# Patient Record
Sex: MALE | Race: BLACK OR AFRICAN AMERICAN | NOT HISPANIC OR LATINO | Employment: UNEMPLOYED | URBAN - METROPOLITAN AREA
[De-identification: names, ages, dates, MRNs, and addresses within clinical notes are randomized per-mention and may not be internally consistent; named-entity substitution may affect disease eponyms.]

---

## 2023-10-04 ENCOUNTER — TELEPHONE (OUTPATIENT)
Dept: BEHAVIORAL HEALTH | Facility: CLINIC | Age: 33
End: 2023-10-04
Payer: MEDICAID

## 2023-10-08 RX ORDER — CARVEDILOL 3.12 MG/1
3.12 TABLET ORAL 2 TIMES DAILY
COMMUNITY
Start: 2023-09-12

## 2023-10-08 RX ORDER — ADALIMUMAB 40MG/0.4ML
KIT SUBCUTANEOUS
COMMUNITY
Start: 2022-08-31

## 2023-10-08 RX ORDER — OXYBUTYNIN CHLORIDE 5 MG/1
5 TABLET, EXTENDED RELEASE ORAL DAILY
COMMUNITY
Start: 2020-09-04

## 2023-10-08 RX ORDER — DULAGLUTIDE 3 MG/.5ML
INJECTION, SOLUTION SUBCUTANEOUS
COMMUNITY

## 2023-10-08 RX ORDER — BUSPIRONE HYDROCHLORIDE 5 MG/1
5 TABLET ORAL 2 TIMES DAILY
COMMUNITY
Start: 2023-08-01 | End: 2023-10-10

## 2023-10-08 RX ORDER — OMEPRAZOLE 40 MG/1
40 CAPSULE, DELAYED RELEASE ORAL DAILY
COMMUNITY
Start: 2020-09-04

## 2023-10-08 RX ORDER — LISINOPRIL AND HYDROCHLOROTHIAZIDE 12.5; 2 MG/1; MG/1
1 TABLET ORAL DAILY
COMMUNITY
Start: 2023-08-07

## 2023-10-08 RX ORDER — NALTREXONE 380 MG
380 KIT INTRAMUSCULAR
COMMUNITY
Start: 2020-07-23 | End: 2023-10-10 | Stop reason: SDUPTHER

## 2023-10-08 RX ORDER — GLIMEPIRIDE 4 MG/1
4 TABLET ORAL
COMMUNITY

## 2023-10-08 RX ORDER — ZIPRASIDONE HYDROCHLORIDE 80 MG/1
160 CAPSULE ORAL 2 TIMES DAILY
COMMUNITY
End: 2023-10-10 | Stop reason: SDUPTHER

## 2023-10-08 RX ORDER — TRAZODONE HYDROCHLORIDE 150 MG/1
150 TABLET ORAL NIGHTLY
COMMUNITY
Start: 2023-09-12 | End: 2024-03-18 | Stop reason: WASHOUT

## 2023-10-08 RX ORDER — ARIPIPRAZOLE LAUROXIL 882 MG/3.2ML
882 INJECTION, SUSPENSION, EXTENDED RELEASE INTRAMUSCULAR
COMMUNITY
Start: 2020-09-30 | End: 2023-10-10 | Stop reason: SDUPTHER

## 2023-10-08 RX ORDER — ATORVASTATIN CALCIUM 10 MG/1
10 TABLET, FILM COATED ORAL DAILY
COMMUNITY
Start: 2020-09-04

## 2023-10-08 RX ORDER — LEVOTHYROXINE SODIUM 200 UG/1
200 TABLET ORAL
COMMUNITY
Start: 2020-09-04

## 2023-10-08 NOTE — PROGRESS NOTES
A HIPAA-compliant interactive audio and video telecommunication system which permits real time communications between the patient (at the originating site) and provider (at the distant site) was utilized to provide this telehealth service.     The patient, family, caregivers and guardian (as appropriate) have provided consent on this date to conduct treatment via this telehealth service.  The patient's identity and physical location were verified at the time of this visit.      Present for appointment: Mom/guardian (Martinez).    SUBJECTIVE    No significant changes for Chris since we last met. O/C behaviors are no better; still quite problematic. Does not feel that the weekly behavioral therapy sessions done at home through [employer] are providing any real benefits. Mom feels like buspirone may be increasing appetite and causing some associated weight gain for him. He's still having occasional sleepless nights. He has been doing weekly ST at [facility] in [place]. He is not having any EPS or TD as observed by mom.     Review of Systems   Constitutional:  Positive for appetite change.   HENT:  Negative for drooling and trouble swallowing.    Respiratory:  Negative for choking.    Gastrointestinal:  Positive for diarrhea. Negative for constipation and vomiting.   Genitourinary:  Negative for enuresis.   Musculoskeletal:  Negative for gait problem.   Neurological:  Negative for dizziness, tremors, seizures and syncope.   Psychiatric/Behavioral:  Positive for behavioral problems and sleep disturbance.       Controlled Substance Evaluation  [place]ARRS/PDMP reviewed: Nils Willingham MD on 10/10/2023  4:33 PM    MEDICATION HISTORY  Buspirone - dosed up to 5mg twice daily w/o benefit  Fluoxetine - increased irritability  Fluvoxamine - insomnia, behavioral activation  Quetiapine - no benefit  Risperidone - took for many years, caused significant weight gain & gynecomastia  Ritalin - increased  "agitation  Sertraline - insomnia, behavioral activation  Topiramate - irritability    SOCIAL HISTORY  Living situation Lives with family   Provider agency N/A   Work or day program None currently   School N/A   Guardianship Mother (Martinez)   KERWIN [facility] - [employer]   Bx Specialist ?   Nicotine None   Alcohol None   Other drugs None     OBJECTIVE    No results found for: \"HGB\", \"PLT\", \"NEUTROABS\", \"GLUCOSE\", \"NA\", \"K\", \"CO2\", \"CALCIUM\", \"CREATININE\", \"AST\", \"ALT\", \"HGBA1C\", \"AMMONIA\", \"TSH\", \"FREET4\", \"CHOL\", \"LDLF\", \"TRIG\", \"PVSXETAH31\", \"VITD25\"    Therapeutic Drug Monitoring  07/20/2022: Ziprasidone level = 200 [ULN = 220] (320mg/day)    Electrocardiograms  None in chart    MENTAL STATUS EXAM  Patient not present today.    ASSESSMENT  Mom would like to re-try the bupropion for impulsive/compulsive behaviors.  We discussed possibility of trying low-dose long-acting benzodiazepine (clonazepam) as a next possible medication trial.    PROBLEM LIST  1. ASD  2. Impulse control disorder  3. OCD    PLAN  -- Discontinue buspirone  -- Resume bupropion 75mg QAM for OCD and impulse control  -- Continue Aristada 882mg IM Q4 weeks for impulse control  -- Continue Vivitrol (naltrexone) 380mg IM Q4 weeks for impulse control  -- Continue ziprasidone 160mg BID for impulse control  -- Continue trazodone 150mg QHS for sleep (Rxd by PCP)  -- Follow up 2 months    "

## 2023-10-10 ENCOUNTER — TELEMEDICINE (OUTPATIENT)
Dept: BEHAVIORAL HEALTH | Facility: CLINIC | Age: 33
End: 2023-10-10
Payer: MEDICAID

## 2023-10-10 DIAGNOSIS — F63.9 IMPULSE CONTROL DISORDER: ICD-10-CM

## 2023-10-10 DIAGNOSIS — F42.8 OBSESSIVE COMPULSIVE NEUROSIS: Primary | ICD-10-CM

## 2023-10-10 DIAGNOSIS — F84.0 AUTISM SPECTRUM DISORDER (HHS-HCC): ICD-10-CM

## 2023-10-10 PROCEDURE — 99214 OFFICE O/P EST MOD 30 MIN: CPT | Performed by: PSYCHIATRY & NEUROLOGY

## 2023-10-10 RX ORDER — ZIPRASIDONE HYDROCHLORIDE 80 MG/1
160 CAPSULE ORAL 2 TIMES DAILY
Qty: 120 CAPSULE | Refills: 11 | Status: SHIPPED | OUTPATIENT
Start: 2023-10-10

## 2023-10-10 RX ORDER — HYDROCHLOROTHIAZIDE 12.5 MG/1
12.5 TABLET ORAL DAILY
COMMUNITY
End: 2023-10-10 | Stop reason: SDUPTHER

## 2023-10-10 RX ORDER — NALTREXONE 380 MG
380 KIT INTRAMUSCULAR
Qty: 4 ML | Refills: 12 | Status: SHIPPED | OUTPATIENT
Start: 2023-10-10

## 2023-10-10 RX ORDER — BUPROPION HYDROCHLORIDE 75 MG/1
75 TABLET ORAL DAILY
Qty: 30 TABLET | Refills: 1 | Status: SHIPPED | OUTPATIENT
Start: 2023-10-10 | End: 2023-12-05 | Stop reason: SDUPTHER

## 2023-10-10 RX ORDER — TRETINOIN 0.25 MG/G
CREAM TOPICAL NIGHTLY
COMMUNITY

## 2023-10-10 RX ORDER — INSULIN LISPRO 100 [IU]/ML
INJECTION, SOLUTION INTRAVENOUS; SUBCUTANEOUS
COMMUNITY

## 2023-10-10 RX ORDER — LISINOPRIL 20 MG/1
20 TABLET ORAL DAILY
COMMUNITY
End: 2023-10-10 | Stop reason: SDUPTHER

## 2023-10-10 RX ORDER — ARIPIPRAZOLE LAUROXIL 882 MG/3.2ML
882 INJECTION, SUSPENSION, EXTENDED RELEASE INTRAMUSCULAR
Qty: 3.2 ML | Refills: 12 | Status: SHIPPED | OUTPATIENT
Start: 2023-10-10

## 2023-10-10 SDOH — ECONOMIC STABILITY: TRANSPORTATION INSECURITY
IN THE PAST 12 MONTHS, HAS THE LACK OF TRANSPORTATION KEPT YOU FROM MEDICAL APPOINTMENTS OR FROM GETTING MEDICATIONS?: NO

## 2023-10-10 SDOH — ECONOMIC STABILITY: FOOD INSECURITY: WITHIN THE PAST 12 MONTHS, THE FOOD YOU BOUGHT JUST DIDN'T LAST AND YOU DIDN'T HAVE MONEY TO GET MORE.: NEVER TRUE

## 2023-10-10 SDOH — ECONOMIC STABILITY: HOUSING INSECURITY
IN THE LAST 12 MONTHS, WAS THERE A TIME WHEN YOU DID NOT HAVE A STEADY PLACE TO SLEEP OR SLEPT IN A SHELTER (INCLUDING NOW)?: NO

## 2023-10-10 SDOH — ECONOMIC STABILITY: INCOME INSECURITY: IN THE LAST 12 MONTHS, WAS THERE A TIME WHEN YOU WERE NOT ABLE TO PAY THE MORTGAGE OR RENT ON TIME?: NO

## 2023-10-10 SDOH — ECONOMIC STABILITY: TRANSPORTATION INSECURITY
IN THE PAST 12 MONTHS, HAS LACK OF TRANSPORTATION KEPT YOU FROM MEETINGS, WORK, OR FROM GETTING THINGS NEEDED FOR DAILY LIVING?: NO

## 2023-10-10 SDOH — ECONOMIC STABILITY: FOOD INSECURITY: WITHIN THE PAST 12 MONTHS, YOU WORRIED THAT YOUR FOOD WOULD RUN OUT BEFORE YOU GOT MONEY TO BUY MORE.: NEVER TRUE

## 2023-10-10 ASSESSMENT — ENCOUNTER SYMPTOMS
DIARRHEA: 1
SEIZURES: 0
CHOKING: 0
CONSTIPATION: 0
TROUBLE SWALLOWING: 0
VOMITING: 0
SLEEP DISTURBANCE: 1
APPETITE CHANGE: 1
DIZZINESS: 0
TREMORS: 0

## 2023-10-10 ASSESSMENT — SOCIAL DETERMINANTS OF HEALTH (SDOH)
WITHIN THE LAST YEAR, HAVE YOU BEEN HUMILIATED OR EMOTIONALLY ABUSED IN OTHER WAYS BY YOUR PARTNER OR EX-PARTNER?: NO
WITHIN THE LAST YEAR, HAVE TO BEEN RAPED OR FORCED TO HAVE ANY KIND OF SEXUAL ACTIVITY BY YOUR PARTNER OR EX-PARTNER?: NO
WITHIN THE LAST YEAR, HAVE YOU BEEN KICKED, HIT, SLAPPED, OR OTHERWISE PHYSICALLY HURT BY YOUR PARTNER OR EX-PARTNER?: NO
IN THE PAST 12 MONTHS, HAS THE ELECTRIC, GAS, OIL, OR WATER COMPANY THREATENED TO SHUT OFF SERVICE IN YOUR HOME?: NO
WITHIN THE LAST YEAR, HAVE YOU BEEN AFRAID OF YOUR PARTNER OR EX-PARTNER?: NO

## 2023-10-24 ENCOUNTER — PROCEDURE VISIT (OUTPATIENT)
Dept: BEHAVIORAL HEALTH | Facility: CLINIC | Age: 33
End: 2023-10-24
Payer: MEDICAID

## 2023-10-24 VITALS
HEART RATE: 98 BPM | RESPIRATION RATE: 16 BRPM | WEIGHT: 280.6 LBS | DIASTOLIC BLOOD PRESSURE: 87 MMHG | TEMPERATURE: 98.4 F | HEIGHT: 67 IN | SYSTOLIC BLOOD PRESSURE: 134 MMHG | BODY MASS INDEX: 44.04 KG/M2

## 2023-10-24 DIAGNOSIS — F63.9 IMPULSE CONTROL DISORDER: Primary | ICD-10-CM

## 2023-10-24 NOTE — PROGRESS NOTES
Patient here at the clinic for his monthly injections of vivitrol and Aristada for autism spectrum disorder and obsessive compulsive neurosis     Patient identified by full name and , vital signs obtained. Patient last seen by provider on 10/10/23 and last seen by nurse on 23. Medication verified by providers note and medication order.     Appetite: no change  Sleep: broken  Appearance: clean, age appropriate, well-groomed  Build: obese   Attitude: cooperative, calm, pleasant, friendly  Eye Contact: avoidance   Activity: alert, attentive, appropriate  Speech: non-verbal  Delusions: mom denies  Thought Content: Intellectually disabled, non-verbal, but patient will follow simple commands  Thought Process: Intellectually disabled, non-verbal, but follows simple commands  Judgement/insight/ intellectually disabled but can distinguish between right and wrong  Mood: calm,happy   Affect: appropriate  AH/VH/SI/HI: mom denies  Access to firearms/weapons: No  Depression: mom denies  Thoughts of hopelessness: no evidence of this per mom  Anxiety: mom denies  Self-injurious behavior: mom denies  Cravings/Urges: NA  Last use: NA  Tobacco Use: non-smoker  Spiritual or cultural practices that may affect your care or impact your health care decisions? No  Living situation: Patient lives with his mother  Employed: No     Patient accompanied by mother today for monthly Vivitrol and Aristada IM injections. Patient is non-verbal but understands spoken words and able to answer yes and no questions by nodding.Patient can also follow verbal instructions. Per mom,there has been no evidence of SI/HI, self harming behaviors, no new medications or recent hospitalizations to report at this time.     Patient received monthly aristada 882mg (3.2ml) via 20G in his left gluteal area with no issues with the injection and no reaction at the injection site. Patient tolerated procedure well..      Lot: [id number]  Exp: 31 AUG 2025  NDC:  49722-512-99     Patient received Vivitrol 380mg via 20G into right gluteal area. Patient tolerated injection well, no reaction at the injection site.     Lot #: [id number]  Exp: 28 FEB,2026  NDC: 60617-114-49     RN provided education on medication and side effects and the importance of maintaining monthly injection schedule. RN provided mom with an office number to call for any future questions and concerns. Mom was also provided with mobile crisis number for emergent situations.     Patient to RTC in 4 weeks     Anisha Corral RN,BSN

## 2023-10-24 NOTE — PROGRESS NOTES
Patient here at the clinic for his monthly injections of vivitrol and Aristada for autism spectrum disorder and obsessive compulsive neurosis     Patient identified by full name and , vital signs obtained. Patient last seen by provider on 10/10/23 and last seen by nurse on 23. Medication verified by providers note and medication order.     Appetite: no change  Sleep: broken  Appearance: clean, age appropriate, well-groomed  Build: obese   Attitude: cooperative, calm, pleasant, friendly  Eye Contact: avoidance   Activity: alert, attentive, appropriate  Speech: non-verbal  Delusions: mom denies  Thought Content: Intellectually disabled, non-verbal, but patient will follow simple commands  Thought Process: Intellectually disabled, non-verbal, but follows simple commands  Judgement/insight/ intellectually disabled but can distinguish between right and wrong  Mood: calm,happy   Affect: appropriate  AH/VH/SI/HI: mom denies  Access to firearms/weapons: No  Depression: mom denies  Thoughts of hopelessness: no evidence of this per mom  Anxiety: mom denies  Self-injurious behavior: mom denies  Cravings/Urges: NA  Last use: NA  Tobacco Use: non-smoker  Spiritual or cultural practices that may affect your care or impact your health care decisions? No  Living situation: Patient lives with his mother  Employed: No     Patient accompanied by mother today for monthly Vivitrol and Aristada IM injections. Patient is non-verbal but understands spoken words and able with a bright affect. Patient is in a good mood, very cooperative and able to follow verbal instructions. We were able to obtain a blood pressure today and it was a very good reading. Per mom,there has been no evidence of SI/HI, self harming behaviors, no new medications or recent hospitalizations to report at this time.     Patient received monthly aristada 882mg (3.2ml) via 20G in his right gluteal area with no issues with the injection and no reaction at the  injection site. Patient tolerated procedure well..      Lot: [id number]  Exp: FEB28 2025  NDC: 82454-640-67  INJ: 19408     Patient received Vivitrol 380mg via 20G into left gluteal area. Patient tolerated injection well, no reaction at the injection site.     Lot #: [id number]  Exp: 30NOV,2025  ND: 24265-503-24  INJ: [id number]     RN provided education on medication and side effects and the importance of maintaining monthly injection schedule. RN provided mom with an office number to call for any future questions and concerns. Mom was also provided with mobile crisis number for emergent situations.     Patient to RTC in 4 weeks 10/24/23

## 2023-11-21 ENCOUNTER — PROCEDURE VISIT (OUTPATIENT)
Dept: BEHAVIORAL HEALTH | Facility: CLINIC | Age: 33
End: 2023-11-21
Payer: MEDICAID

## 2023-11-21 VITALS
DIASTOLIC BLOOD PRESSURE: 80 MMHG | RESPIRATION RATE: 16 BRPM | TEMPERATURE: 98.1 F | HEART RATE: 99 BPM | SYSTOLIC BLOOD PRESSURE: 134 MMHG

## 2023-11-21 DIAGNOSIS — F63.9 IMPULSE CONTROL DISORDER: ICD-10-CM

## 2023-11-21 DIAGNOSIS — F84.0 AUTISM SPECTRUM DISORDER (HHS-HCC): Primary | ICD-10-CM

## 2023-11-21 RX ORDER — GENTAMICIN SULFATE 1 MG/G
OINTMENT TOPICAL
COMMUNITY
Start: 2023-11-18

## 2023-11-21 RX ORDER — BLOOD SUGAR DIAGNOSTIC
STRIP MISCELLANEOUS
COMMUNITY
Start: 2023-11-15

## 2023-11-21 RX ORDER — ADALIMUMAB 40MG/0.4ML
KIT SUBCUTANEOUS
COMMUNITY
Start: 2023-11-13

## 2023-11-21 RX ORDER — INSULIN LISPRO 100 [IU]/ML
INJECTION, SUSPENSION SUBCUTANEOUS
COMMUNITY
Start: 2023-11-16

## 2023-11-21 RX ORDER — PEN NEEDLE, DIABETIC 32 GX 1/4"
NEEDLE, DISPOSABLE MISCELLANEOUS
COMMUNITY
Start: 2023-11-16

## 2023-11-21 RX ORDER — LANCETS 33 GAUGE
EACH MISCELLANEOUS
COMMUNITY
Start: 2023-11-16

## 2023-11-21 ASSESSMENT — PAIN SCALES - GENERAL: PAINLEVEL: 0-NO PAIN

## 2023-11-21 NOTE — PROGRESS NOTES
Patient here at the clinic for his monthly injections of vivitrol and Aristada for autism spectrum disorder and obsessive compulsive neurosis     Patient identified by full name and , vital signs obtained. Patient last seen by provider on 10/10/23 and last seen by nurse on 10/24/23.  Medication verified by providers note and medication order.     Appetite: no change  Sleep: broken  Appearance: clean, age appropriate, well-groomed  Build: obese   Attitude: cooperative, calm, pleasant, friendly  Eye Contact: avoidance   Activity: alert, attentive, appropriate  Speech: non-verbal  Delusions: mom denies  Thought Content: Intellectually disabled, non-verbal, but patient will follow simple commands  Thought Process: Intellectually disabled, non-verbal, but follows simple commands  Judgement/insight/ intellectually disabled but can distinguish between right and wrong  Mood: calm,happy   Affect: appropriate  AH/VH/SI/HI: mom denies  Access to firearms/weapons: No  Depression: mom denies  Thoughts of hopelessness: no evidence of this per mom  Anxiety: mom denies  Self-injurious behavior: mom denies  Cravings/Urges: NA  Last use: NA  Tobacco Use: non-smoker  Spiritual or cultural practices that may affect your care or impact your health care decisions? No  Living situation: Patient lives with his mother  Employed: No     Patient accompanied by mother today for monthly Vivitrol and Aristada IM injections. Patient is non-verbal but understands spoken words and able to answer yes and no questions by nodding.Patient can also follow verbal instructions. Per mom,there has been no evidence of SI/HI, self harming behaviors, no new medications or recent hospitalizations to report at this time.     Patient received monthly aristada 882mg (3.2ml) via 20G in his left gluteal area with no issues with the injection and no reaction at the injection site. Patient tolerated procedure well..      Lot: [id number]  Exp: 31 AUG 2025  NDC:  25071-258-12     Patient received Vivitrol 380mg via 20G into right gluteal area. Patient tolerated injection well, no reaction at the injection site.     Lot #: [id number]  Exp: 28 FEB,2026  NDC: 26562-186-26     RN provided education on medication and side effects and the importance of maintaining monthly injection schedule. RN provided mom with an office number to call for any future questions and concerns. Mom was also provided with mobile crisis number for emergent situations.     Patient to RTC in 4 weeks     Margot Gu RN,BSN

## 2023-12-05 ENCOUNTER — OFFICE VISIT (OUTPATIENT)
Dept: BEHAVIORAL HEALTH | Facility: CLINIC | Age: 33
End: 2023-12-05
Payer: MEDICAID

## 2023-12-05 DIAGNOSIS — F63.9 IMPULSE CONTROL DISORDER: Primary | ICD-10-CM

## 2023-12-05 DIAGNOSIS — F84.0 AUTISM SPECTRUM DISORDER (HHS-HCC): ICD-10-CM

## 2023-12-05 DIAGNOSIS — E66.01 OBESITY, CLASS III, BMI 40-49.9 (MORBID OBESITY) (MULTI): ICD-10-CM

## 2023-12-05 DIAGNOSIS — F42.8 OBSESSIVE COMPULSIVE NEUROSIS: ICD-10-CM

## 2023-12-05 DIAGNOSIS — F50.81 BINGE EATING DISORDER: ICD-10-CM

## 2023-12-05 PROBLEM — E78.2 MIXED HYPERLIPIDEMIA: Status: ACTIVE | Noted: 2023-12-05

## 2023-12-05 PROBLEM — I10 ESSENTIAL HYPERTENSION: Status: ACTIVE | Noted: 2022-06-16

## 2023-12-05 PROBLEM — F50.819 BINGE EATING DISORDER: Status: ACTIVE | Noted: 2020-04-23

## 2023-12-05 PROBLEM — E55.9 VITAMIN D DEFICIENCY: Status: ACTIVE | Noted: 2023-12-05

## 2023-12-05 PROCEDURE — 90846 FAMILY PSYTX W/O PT 50 MIN: CPT | Performed by: PSYCHIATRY & NEUROLOGY

## 2023-12-05 PROCEDURE — 1036F TOBACCO NON-USER: CPT | Performed by: PSYCHIATRY & NEUROLOGY

## 2023-12-05 RX ORDER — BUPROPION HYDROCHLORIDE 75 MG/1
75 TABLET ORAL DAILY
Qty: 30 TABLET | Refills: 2 | Status: SHIPPED | OUTPATIENT
Start: 2023-12-05 | End: 2024-03-18 | Stop reason: WASHOUT

## 2023-12-05 ASSESSMENT — ENCOUNTER SYMPTOMS
CONSTIPATION: 0
DIZZINESS: 0
TREMORS: 0
VOMITING: 0
SEIZURES: 0
CHOKING: 0
APPETITE CHANGE: 1
SLEEP DISTURBANCE: 0
TROUBLE SWALLOWING: 0
DIARRHEA: 0

## 2023-12-05 NOTE — PROGRESS NOTES
"Outpatient Psychiatry    A HIPAA-compliant interactive audio and video telecommunication system which permits real time communications between the patient (at the originating site) and provider (at the distant site) was utilized to provide this telehealth service.     The patient, family, caregivers and guardian (as appropriate) have provided consent on this date to conduct treatment via this telehealth service.  The patient's identity and physical location were verified at the time of this visit.      Present for appointment: Mom/guardian (Martinez).    SUBJECTIVE    Mom says Chris is doing \"fair\" to \"pretty good.\"  O/C behaviors are about the same (\"no better, no worse\" per mom).  Seems to be tolerating bupropion well without side effects.  He has been able to focus on his weekly one-hour speech therapy sessions which he enjoys.  Not as many sleep difficulties as before.     Review of Systems   Constitutional:  Positive for appetite change (decrease).   HENT:  Negative for drooling and trouble swallowing.    Respiratory:  Negative for choking.    Gastrointestinal:  Negative for constipation, diarrhea and vomiting.   Genitourinary:  Negative for enuresis.   Musculoskeletal:  Negative for gait problem.   Neurological:  Negative for dizziness, tremors, seizures and syncope.   Psychiatric/Behavioral:  Positive for behavioral problems. Negative for self-injury and sleep disturbance.      MEDICATION HISTORY  Buspirone - dosed up to 5mg twice daily w/o benefit  Fluoxetine - increased irritability  Fluvoxamine - insomnia, behavioral activation  Quetiapine - no benefit  Risperidone - took for many years, caused significant weight gain & gynecomastia  Ritalin - increased agitation  Sertraline - insomnia, behavioral activation  Topiramate - irritability    SOCIAL HISTORY  Living situation Lives with family   Provider agency N/A   Work or day program None currently  Weekly ST at [employer].    School N/A "   Guardianship Mother (Martinez)   KERWIN [facility]   Bx Specialist ?   Nicotine None   Alcohol None   Other drugs None     OBJECTIVE    Lab Results   Component Value Date    HGBA1C 5.6 04/08/2023     Therapeutic Drug Monitoring  07/20/2022: Ziprasidone level = 200 [ULN = 220] (320mg/day)    Electrocardiograms  None in chart    MENTAL STATUS EXAM  Patient unable to participate in virtual visit    ASSESSMENT  Continue current meds. We will try to obtain a trough serum aripiprazole level prior to his next Aristada injection on 12/19/23.    PROBLEM LIST  ASD  Impulse control disorder  OCD    PLAN  -- Continue bupropion 75mg QAM for OCD and impulse control  -- Continue Aristada 882mg IM Q4 weeks for impulse control  -- Continue Vivitrol (naltrexone) 380mg IM Q4 weeks for impulse control  -- Continue ziprasidone 160mg BID for impulse control  -- Continue trazodone 150mg QHS for sleep (Rxd by PCP)  -- Follow up 8 weeks    Nils Willingham MD

## 2023-12-06 ENCOUNTER — TELEPHONE (OUTPATIENT)
Dept: BEHAVIORAL HEALTH | Facility: CLINIC | Age: 33
End: 2023-12-06
Payer: MEDICAID

## 2023-12-06 NOTE — TELEPHONE ENCOUNTER
----- Message from Nils Willingham MD sent at 12/5/2023  5:24 PM EST -----  Regarding: Lab order  Still trying to figure out how to get a serum Abilify level done.  Mom is going to try to take him to a [facility] lab just before his next injection in December.  We just need to call [facility] lab and find out if they can order it on their end if mom brings in the paper order requisition from .  Appreciate if you can call the lab to find out (and if there are any particular instructions to give to mom let me know and I'll send her lab req.).  Thanks!

## 2023-12-19 ENCOUNTER — PROCEDURE VISIT (OUTPATIENT)
Dept: BEHAVIORAL HEALTH | Facility: CLINIC | Age: 33
End: 2023-12-19
Payer: MEDICAID

## 2023-12-19 VITALS
RESPIRATION RATE: 16 BRPM | SYSTOLIC BLOOD PRESSURE: 128 MMHG | DIASTOLIC BLOOD PRESSURE: 79 MMHG | TEMPERATURE: 98.1 F | HEART RATE: 80 BPM

## 2023-12-19 DIAGNOSIS — F84.0 AUTISM SPECTRUM DISORDER (HHS-HCC): ICD-10-CM

## 2023-12-19 DIAGNOSIS — F42.8 OBSESSIVE COMPULSIVE NEUROSIS: Primary | ICD-10-CM

## 2023-12-19 ASSESSMENT — PAIN SCALES - GENERAL: PAINLEVEL: 0-NO PAIN

## 2023-12-19 NOTE — PROGRESS NOTES
Patient here at the clinic for his monthly injections of vivitrol and Aristada for autism spectrum disorder and obsessive compulsive neurosis     Patient identified by full name and , vital signs obtained. Patient last seen by provider on 23 and last seen by nurse on 23.  Medication verified by providers note and medication order.     Appetite: no change  Sleep: broken  Appearance: clean, age appropriate, well-groomed  Build: obese   Attitude: cooperative, calm, pleasant, friendly  Eye Contact: avoidance   Activity: alert, attentive, appropriate  Speech: non-verbal  Delusions: mom denies  Thought Content: Intellectually disabled, non-verbal, but patient will follow simple commands  Thought Process: Intellectually disabled, non-verbal, but follows simple commands  Judgement/insight/ intellectually disabled but can distinguish between right and wrong  Mood: calm,happy   Affect: appropriate  AH/VH/SI/HI: mom denies  Access to firearms/weapons: No  Depression: mom denies  Thoughts of hopelessness: no evidence of this per mom  Anxiety: mom denies  Self-injurious behavior: mom denies  Cravings/Urges: NA  Last use: NA  Tobacco Use: non-smoker  Spiritual or cultural practices that may affect your care or impact your health care decisions? No  Living situation: Patient lives with his mother  Employed: No     Patient accompanied by mother today for monthly Vivitrol and Aristada IM injections. Patient is non-verbal but understands spoken words and able to answer yes and no questions by nodding his head.  Patient can also follow verbal instructions. Per mom,there has been no evidence of SI/HI, self harming behaviors, no new medications or recent hospitalizations to report at this time.     Patient received monthly aristada 882mg (3.2ml) via 20G in his right gluteal area with no issues with the injection and no reaction at the injection site. Patient tolerated procedure well..      Lot: [id number]  Exp: 31 AUG  2025  NDC: 41366-222-08     Patient received Vivitrol 380mg via 20G into left gluteal area. Patient tolerated injection well, no reaction at the injection site.     Lot #: [id number]  Exp: [id number]  NDC: 46501-318-63     RN provided education on medication and side effects and the importance of maintaining monthly injection schedule. RN provided mom with an office number to call for any future questions and concerns. Mom was also provided with mobile crisis number for emergent situations.     Patient to RTC in 4 weeks 1/16/24    Margot Gu RN,BSN

## 2024-01-16 ENCOUNTER — PROCEDURE VISIT (OUTPATIENT)
Dept: BEHAVIORAL HEALTH | Facility: CLINIC | Age: 34
End: 2024-01-16
Payer: MEDICAID

## 2024-01-16 VITALS — TEMPERATURE: 97.8 F | RESPIRATION RATE: 16 BRPM | HEART RATE: 77 BPM

## 2024-01-16 DIAGNOSIS — F42.8 OBSESSIVE COMPULSIVE NEUROSIS: Primary | ICD-10-CM

## 2024-01-16 DIAGNOSIS — F84.0 AUTISM SPECTRUM DISORDER (HHS-HCC): ICD-10-CM

## 2024-01-16 ASSESSMENT — PAIN SCALES - GENERAL: PAINLEVEL: 0-NO PAIN

## 2024-01-16 NOTE — PROGRESS NOTES
Patient here at the clinic for his monthly injections of vivitrol and Aristada for autism spectrum disorder and obsessive compulsive neurosis     Patient identified by full name and , vital signs obtained. Patient last seen by provider on 23 and last seen by nurse on 23.  Medication verified by providers note and medication order.     Appetite: no change  Sleep: broken  Appearance: clean, age appropriate, well-groomed  Build: obese   Attitude: cooperative, calm, pleasant, friendly  Eye Contact: avoidance   Activity: alert, attentive, appropriate  Speech: non-verbal  Delusions: mom denies  Thought Content: Intellectually disabled, non-verbal, but patient will follow simple commands  Thought Process: Intellectually disabled, non-verbal, but follows simple commands  Judgement/insight/ intellectually disabled but can distinguish between right and wrong  Mood: calm,happy   Affect: appropriate  AH/VH/SI/HI: mom denies  Access to firearms/weapons: No  Depression: mom denies  Thoughts of hopelessness: no evidence of this per mom  Anxiety: mom denies  Self-injurious behavior: mom denies  Cravings/Urges: NA  Last use: NA  Tobacco Use: non-smoker  Spiritual or cultural practices that may affect your care or impact your health care decisions? No  Living situation: Patient lives with his mother  Employed: No     Patient accompanied by mother today for monthly Vivitrol and Aristada IM injections. Patient is non-verbal but understands spoken words and able to answer yes and no questions by nodding his head.  Patient can also follow verbal instructions. Per mom,there has been no evidence of SI/HI, self harming behaviors, no new medications or recent hospitalizations to report at this time.     Patient received monthly aristada 882mg (3.2ml) via 20G in his left gluteal area with no issues with the injection and no reaction at the injection site. Patient tolerated procedure well..      Lot: [id number]  Exp: 31 AUG  2025  NDC: 15436-207-95     Patient received Vivitrol 380mg via 20G into right gluteal area. Patient tolerated injection well, no reaction at the injection site.     Lot #: [id number]  Exp: [id number]  NDC: 97866-771-90     RN provided education on medication and side effects and the importance of maintaining monthly injection schedule. RN provided mom with an office number to call for any future questions and concerns. Mom was also provided with mobile crisis number for emergent situations.     Patient to RTC in 4 weeks 2/13/23    Margot Gu RN,BSN

## 2024-01-30 ENCOUNTER — APPOINTMENT (OUTPATIENT)
Dept: BEHAVIORAL HEALTH | Facility: CLINIC | Age: 34
End: 2024-01-30
Payer: MEDICAID

## 2024-01-30 NOTE — PROGRESS NOTES
"Outpatient Psychiatry    A HIPAA-compliant interactive audio and video telecommunication system which permits real time communications between the patient (at the originating site) and provider (at the distant site) was utilized to provide this telehealth service.     The patient, family, caregivers and guardian (as appropriate) have provided consent on this date to conduct treatment via this telehealth service.  The patient's identity and physical location were verified at the time of this visit.      Present for appointment: Mom/guardian (Martinez).    SUBJECTIVE    Mom says Chris is doing \"fair\" to \"pretty good.\"  O/C behaviors are about the same (\"no better, no worse\" per mom).  Seems to be tolerating bupropion well without side effects.  He has been able to focus on his weekly one-hour speech therapy sessions which he enjoys.  Not as many sleep difficulties as before.     Review of Systems    MEDICATION HISTORY  Buspirone - dosed up to 5mg twice daily w/o benefit  Fluoxetine - increased irritability  Fluvoxamine - insomnia, behavioral activation  Quetiapine - no benefit  Risperidone - took for many years, caused significant weight gain & gynecomastia  Ritalin - increased agitation  Sertraline - insomnia, behavioral activation  Topiramate - irritability    SOCIAL HISTORY  Living situation Lives with family   Provider agency N/A   Work or day program None currently  Weekly ST at [employer] [place].    School N/A   Guardianship Mother (Martinez)   KERWIN [facility]   Bx Specialist ?   Nicotine None   Alcohol None   Other drugs None     OBJECTIVE    Lab Results   Component Value Date    HGBA1C 5.8 11/27/2023     Therapeutic Drug Monitoring  07/20/2022: Ziprasidone level = 200 [ULN = 220] (320mg/day)    Electrocardiograms  None in chart    MENTAL STATUS EXAM  Patient unable to participate in virtual visit    ASSESSMENT  Continue current meds. We will try to obtain a trough serum " aripiprazole level prior to his next Aristada injection on 12/19/23.    PROBLEM LIST  ASD  Impulse control disorder  OCD    PLAN  -- Continue bupropion 75mg QAM for OCD and impulse control  -- Continue Aristada 882mg IM Q4 weeks for impulse control  -- Continue Vivitrol (naltrexone) 380mg IM Q4 weeks for impulse control  -- Continue ziprasidone 160mg BID for impulse control  -- Continue trazodone 150mg QHS for sleep (Rxd by PCP)  -- Follow up 8 weeks    Nils Willingham MD    Prep time on date of the patient encounter:  minutes   Time spent directly with patient/family/caregiver:  minutes   Additional time spent on patient care activities:  minutes   Documentation time:  minutes   Other time spent:  minutes   Total time on date of patient encounter:  minutes

## 2024-02-06 ENCOUNTER — APPOINTMENT (OUTPATIENT)
Dept: BEHAVIORAL HEALTH | Facility: CLINIC | Age: 34
End: 2024-02-06
Payer: MEDICAID

## 2024-02-13 ENCOUNTER — PROCEDURE VISIT (OUTPATIENT)
Dept: BEHAVIORAL HEALTH | Facility: CLINIC | Age: 34
End: 2024-02-13
Payer: MEDICAID

## 2024-02-13 VITALS — TEMPERATURE: 97.8 F | HEART RATE: 88 BPM | RESPIRATION RATE: 18 BRPM

## 2024-02-13 DIAGNOSIS — F63.9 IMPULSE CONTROL DISORDER: Primary | ICD-10-CM

## 2024-02-13 DIAGNOSIS — F84.0 AUTISM SPECTRUM DISORDER (HHS-HCC): ICD-10-CM

## 2024-02-13 ASSESSMENT — PAIN SCALES - GENERAL: PAINLEVEL: 0-NO PAIN

## 2024-02-13 NOTE — PROGRESS NOTES
Patient here at the clinic for his monthly injections of vivitrol and Aristada for autism spectrum disorder and obsessive compulsive neurosis     Patient identified by full name and , vital signs obtained. Patient last seen by provider on 23 and last seen by nurse on 24  Medication verified by providers note and medication order.     Appetite: no change  Sleep: broken  Appearance: clean, age appropriate, well-groomed  Build: obese   Attitude: cooperative, calm, pleasant, friendly  Eye Contact: avoidance   Activity: alert, attentive, appropriate  Speech: non-verbal  Delusions: mom denies  Thought Content: Intellectually disabled, non-verbal, but patient will follow simple commands  Thought Process: Intellectually disabled, non-verbal, but follows simple commands  Judgement/insight/ intellectually disabled but can distinguish between right and wrong  Mood: calm,happy   Affect: appropriate  AH/VH/SI/HI: mom denies  Access to firearms/weapons: No  Depression: mom denies  Thoughts of hopelessness: no evidence of this per mom  Anxiety: mom denies  Self-injurious behavior: mom denies  Cravings/Urges: NA  Last use: NA  Tobacco Use: non-smoker  Spiritual or cultural practices that may affect your care or impact your health care decisions? No  Living situation: Patient lives with his mother  Employed: No     Patient accompanied by mother today for monthly Vivitrol and Aristada IM injections. Patient is non-verbal but understands spoken words and able to answer yes and no questions by nodding his head.  Patient can also follow verbal instructions. Per mom,there has been no evidence of SI/HI, self harming behaviors, no new medications or recent hospitalizations to report at this time.     Patient received monthly aristada 882mg (3.2ml) via 20G in his right gluteal area with no issues with the injection and no reaction at the injection site. Patient tolerated procedure well..      Lot: [id number]  Exp: 31 AUG  2025  NDC: 68612-927-01     Patient received Vivitrol 380mg via 20G into left gluteal area. Patient tolerated injection well, no reaction at the injection site.     Lot #: [id number]  Exp: [id number]  NDC: 07545-204-67     RN provided education on medication and side effects and the importance of maintaining monthly injection schedule. RN provided mom with an office number to call for any future questions and concerns. Mom was also provided with mobile crisis number for emergent situations.     Patient to RTC in 4 weeks 3/12/24    Margot Gu RN,BSN

## 2024-03-05 ENCOUNTER — APPOINTMENT (OUTPATIENT)
Dept: BEHAVIORAL HEALTH | Facility: CLINIC | Age: 34
End: 2024-03-05
Payer: MEDICAID

## 2024-03-12 ENCOUNTER — PROCEDURE VISIT (OUTPATIENT)
Dept: BEHAVIORAL HEALTH | Facility: CLINIC | Age: 34
End: 2024-03-12
Payer: MEDICAID

## 2024-03-12 VITALS
RESPIRATION RATE: 18 BRPM | SYSTOLIC BLOOD PRESSURE: 120 MMHG | TEMPERATURE: 98.4 F | HEART RATE: 93 BPM | DIASTOLIC BLOOD PRESSURE: 83 MMHG

## 2024-03-12 DIAGNOSIS — F84.0 AUTISM SPECTRUM DISORDER (HHS-HCC): ICD-10-CM

## 2024-03-12 DIAGNOSIS — F42.8 OBSESSIVE COMPULSIVE NEUROSIS: Primary | ICD-10-CM

## 2024-03-12 ASSESSMENT — PAIN SCALES - GENERAL: PAINLEVEL: 0-NO PAIN

## 2024-03-12 NOTE — PROGRESS NOTES
Patient here at the clinic for his monthly injections of vivitrol and Aristada for autism spectrum disorder and obsessive compulsive neurosis     Patient identified by full name and , vital signs obtained. Patient last seen by provider on 23 and last seen by nurse on 24  Medication verified by providers note and medication order.     Appetite: no change  Sleep: broken  Appearance: clean, age appropriate, well-groomed  Build: obese   Attitude: cooperative, calm, pleasant, friendly  Eye Contact: avoidance   Activity: alert, attentive, appropriate  Speech: non-verbal  Delusions: mom denies  Thought Content: Intellectually disabled, non-verbal, but patient will follow simple commands  Thought Process: Intellectually disabled, non-verbal, but follows simple commands  Judgement/insight/ intellectually disabled but can distinguish between right and wrong  Mood: calm,happy   Affect: appropriate  AH/VH/SI/HI: mom denies  Access to firearms/weapons: No  Depression: mom denies  Thoughts of hopelessness: no evidence of this per mom  Anxiety: mom denies  Self-injurious behavior: mom denies  Cravings/Urges: NA  Last use: NA  Tobacco Use: non-smoker  Spiritual or cultural practices that may affect your care or impact your health care decisions? No  Living situation: Patient lives with his mother  Employed: No     Patient accompanied by mother today for monthly Vivitrol and Aristada IM injections. Patient is non-verbal but understands spoken words and able to answer yes and no questions by nodding his head.  Patient can also follow verbal instructions. Per mom,there has been no evidence of SI/HI, self harming behaviors, no new medications or recent hospitalizations to report at this time.     Patient received monthly aristada 882mg (3.2ml) via 20G in his left gluteal area with no issues with the injection and no reaction at the injection site. Patient tolerated procedure well..      Lot: [id number]  Exp:   2025  NDC: 80257-555-50     Patient received Vivitrol 380mg via 20G into right gluteal area. Patient tolerated injection well, no reaction at the injection site.     Lot #: [id number]  Exp: [id number]  NDC: 49531-160-92     RN provided education on medication and side effects and the importance of maintaining monthly injection schedule. RN provided mom with an office number to call for any future questions and concerns. Mom was also provided with mobile crisis number for emergent situations.     Patient to RTC in 4 weeks 4/9/24    Margot Gu,ISH,BSN

## 2024-03-18 ENCOUNTER — OFFICE VISIT (OUTPATIENT)
Dept: BEHAVIORAL HEALTH | Facility: CLINIC | Age: 34
End: 2024-03-18
Payer: MEDICAID

## 2024-03-18 DIAGNOSIS — F42.8 OBSESSIVE COMPULSIVE NEUROSIS: Primary | ICD-10-CM

## 2024-03-18 DIAGNOSIS — F84.0 AUTISM SPECTRUM DISORDER (HHS-HCC): ICD-10-CM

## 2024-03-18 DIAGNOSIS — F63.9 IMPULSE CONTROL DISORDER: ICD-10-CM

## 2024-03-18 DIAGNOSIS — F50.81 BINGE EATING DISORDER: ICD-10-CM

## 2024-03-18 PROCEDURE — 99214 OFFICE O/P EST MOD 30 MIN: CPT | Performed by: PSYCHIATRY & NEUROLOGY

## 2024-03-18 PROCEDURE — 1036F TOBACCO NON-USER: CPT | Performed by: PSYCHIATRY & NEUROLOGY

## 2024-03-18 ASSESSMENT — ENCOUNTER SYMPTOMS
POLYDIPSIA: 0
TROUBLE SWALLOWING: 0
SLEEP DISTURBANCE: 0
DIARRHEA: 0
DIZZINESS: 0
VOMITING: 0
SEIZURES: 0
CONSTIPATION: 0
TREMORS: 0
CHOKING: 0

## 2024-03-18 NOTE — PROGRESS NOTES
Outpatient Psychiatry    A telephone visit (audio only) between the patient (at the originating site) and the provider (at the distant site) was utilized to provide this telehealth service.    The patient, family, caregivers and guardian (as appropriate) have provided consent to conduct treatment via this telehealth service.  The patient's identity and physical location were verified at the time of this visit.      Present for appointment: Mom/guardian (Martinez).    SUBJECTIVE    Mom stopped giving bupropion about one month ago after he had a spontaneous episode of attempting to bite her, which she attributed to use of the medication.  She also stopped the trazodone because there was some concern that it might be contributing to some recent hair loss.  No significant changes in O/C behaviors.  Still doing speech therapy one hour per week.  [facility] his injections w/o problems.  No change in sleep patterns.  Weight/appetite stable.     Review of Systems   HENT:  Negative for drooling and trouble swallowing.    Respiratory:  Negative for choking.    Gastrointestinal:  Negative for constipation, diarrhea and vomiting.   Endocrine: Negative for polydipsia and polyuria.   Genitourinary:  Negative for enuresis.   Musculoskeletal:  Negative for gait problem.   Neurological:  Negative for dizziness, tremors, seizures and syncope.   Psychiatric/Behavioral:  Positive for behavioral problems. Negative for self-injury and sleep disturbance.      MEDICATION HISTORY  Bupropion  Buspirone - dosed up to 5mg twice daily w/o benefit  Fluoxetine - increased irritability  Fluvoxamine - insomnia, behavioral activation  Quetiapine - no benefit  Risperidone - took for many years, caused significant weight gain & gynecomastia  Ritalin - increased agitation  Sertraline - insomnia, behavioral activation  Topiramate - irritability  Trazodone    SOCIAL HISTORY  Living situation Lives with family   Provider  agency N/A   Work or day program None currently  Weekly ST at [facility] N/A   Guardianship Mother (Martinez)   KERWIN [facility]on [facility]   Bx Specialist ?   Nicotine None   Alcohol None   Other drugs None     OBJECTIVE    Lab Results   Component Value Date    HGBA1C 5.8 11/27/2023     Therapeutic Drug Monitoring  07/20/2022: Ziprasidone level = 200 [ULN = 220] (320mg/day)    Electrocardiograms  None in chart    MENTAL STATUS EXAM  Patient unable to participate in telephone visit    ASSESSMENT  Continue current meds -- unclear if there are any other viable treatment options for O/C behaviors we could consider at this time.  We will try to obtain a serum aripiprazole level with next routine labs (4/03/24).    PROBLEM LIST  ASD  Impulse control disorder  OCD    PLAN  -- Continue Aristada 882mg IM Q4 weeks for impulse control (next due 4/09/24)  -- Continue Vivitrol (naltrexone) 380mg IM Q4 weeks for impulse control (next due 4/09/24)  -- Continue ziprasidone 160mg BID for impulse control  -- Follow up 8 weeks    Nils Willingham MD

## 2024-04-09 ENCOUNTER — PROCEDURE VISIT (OUTPATIENT)
Dept: BEHAVIORAL HEALTH | Facility: CLINIC | Age: 34
End: 2024-04-09
Payer: MEDICAID

## 2024-04-09 VITALS
HEIGHT: 67 IN | SYSTOLIC BLOOD PRESSURE: 141 MMHG | TEMPERATURE: 97.6 F | BODY MASS INDEX: 44.59 KG/M2 | WEIGHT: 284.1 LBS | HEART RATE: 103 BPM | DIASTOLIC BLOOD PRESSURE: 96 MMHG | RESPIRATION RATE: 16 BRPM

## 2024-04-09 DIAGNOSIS — F42.8 OBSESSIVE COMPULSIVE NEUROSIS: Primary | ICD-10-CM

## 2024-04-09 NOTE — PROGRESS NOTES
Patient here at the clinic for his monthly injections of vivitrol and Aristada for autism spectrum disorder and obsessive compulsive neurosis     Patient identified by full name and , vital signs obtained. Patient last seen by provider on 23 and last seen by nurse on 24  Medication verified by providers note and medication order.     Appetite: no change  Sleep: broken  Appearance: clean, age appropriate, well-groomed  Build: obese   Attitude: cooperative, calm, pleasant, friendly  Eye Contact: avoidance   Activity: alert, attentive, appropriate  Speech: non-verbal  Delusions: mom denies  Thought Content: Intellectually disabled, non-verbal, but patient will follow simple commands  Thought Process: Intellectually disabled, non-verbal, but follows simple commands  Judgement/insight/ intellectually disabled but can distinguish between right and wrong  Mood: calm,happy   Affect: appropriate  AH/VH/SI/HI: mom denies  Access to firearms/weapons: No  Depression: mom denies  Thoughts of hopelessness: no evidence of this per mom  Anxiety: mom denies  Self-injurious behavior: mom denies  Cravings/Urges: NA  Last use: NA  Tobacco Use: non-smoker  Spiritual or cultural practices that may affect your care or impact your health care decisions? No  Living situation: Patient lives with his mother  Employed: No     Patient accompanied by mother today for monthly Vivitrol and Aristada IM injections. Patient is non-verbal but understands spoken words and able to answer yes and no questions by nodding his head.  Patient can also follow verbal instructions. Per mom,there has been no evidence of SI/HI, self harming behaviors, no new medications or recent hospitalizations to report at this time.     Patient received monthly aristada 882mg (3.2ml) via 20G in his right gluteal area with no issues at the injection site. Patient tolerated procedure well..      Lot: [id number]  Exp: 2025  NDC: 32578-374-06     Patient  received Vivitrol 380mg via 20G into left gluteal area. Patient tolerated injection well, no reaction at the injection site.     Lot #: [id number]  Exp: [id number]  NDC: 17170-264-23     RN provided education on medication and side effects and the importance of maintaining monthly injection schedule. RN provided mom with an office number to call for any future questions and concerns. Mom was also provided with mobile crisis number for emergent situations.     Patient to RTC in 4 weeks     Anisha Corral RN,BSN

## 2024-05-07 ENCOUNTER — PROCEDURE VISIT (OUTPATIENT)
Dept: BEHAVIORAL HEALTH | Facility: CLINIC | Age: 34
End: 2024-05-07
Payer: MEDICAID

## 2024-05-07 VITALS
RESPIRATION RATE: 18 BRPM | TEMPERATURE: 97.8 F | DIASTOLIC BLOOD PRESSURE: 87 MMHG | HEART RATE: 100 BPM | SYSTOLIC BLOOD PRESSURE: 131 MMHG

## 2024-05-07 DIAGNOSIS — F84.0 AUTISM SPECTRUM DISORDER (HHS-HCC): ICD-10-CM

## 2024-05-07 DIAGNOSIS — F42.8 OBSESSIVE COMPULSIVE NEUROSIS: Primary | ICD-10-CM

## 2024-05-07 ASSESSMENT — PAIN SCALES - GENERAL: PAINLEVEL: 0-NO PAIN

## 2024-05-07 NOTE — PROGRESS NOTES
Patient here at the clinic for his monthly injections of vivitrol and Aristada for autism spectrum disorder and obsessive compulsive neurosis     Patient identified by full name and , vital signs obtained. Patient last seen by provider on 3/18/24 and last seen by nurse on 24  Medication verified by providers note and medication order.     Appetite: no change  Sleep: broken  Appearance: clean, age appropriate, well-groomed  Build: obese   Attitude: cooperative, calm, pleasant, friendly  Eye Contact: avoidance   Activity: alert, attentive, appropriate  Speech: non-verbal  Delusions: mom denies  Thought Content: Intellectually disabled, non-verbal, but patient will follow simple commands  Thought Process: Intellectually disabled, non-verbal, but follows simple commands  Judgement/insight/ intellectually disabled but can distinguish between right and wrong  Mood: calm,happy   Affect: appropriate  AH/VH/SI/HI: mom denies  Access to firearms/weapons: No  Depression: mom denies  Thoughts of hopelessness: no evidence of this per mom  Anxiety: mom denies  Self-injurious behavior: mom denies  Cravings/Urges: NA  Last use: NA  Tobacco Use: non-smoker  Spiritual or cultural practices that may affect your care or impact your health care decisions? No  Living situation: Patient lives with his mother  Employed: No     Patient accompanied by mother today for monthly Vivitrol and Aristada IM injections. Patient is non-verbal but understands spoken words and able to answer yes and no questions by nodding his head.  Patient can also follow verbal instructions. Per mom,there has been no evidence of SI/HI, self harming behaviors, no new medications or recent hospitalizations to report at this time.     Patient received monthly aristada 882mg (3.2ml) via 20G in his left gluteal area with no issues at the injection site. Patient tolerated procedure well..      Lot: [id number]  Exp: 2025  NDC: 75181-782-70     Patient  received Vivitrol 380mg via 20G into right gluteal area. Patient tolerated injection well, no reaction at the injection site.     Lot #: [id number]  Exp: [id number]  NDC: 26617-565-75     RN provided education on medication and side effects and the importance of maintaining monthly injection schedule. RN provided mom with an office number to call for any future questions and concerns. Mom was also provided with mobile crisis number for emergent situations.     Patient to RTC in 4 weeks     Margot Gu RN, BSN

## 2024-05-14 ENCOUNTER — OFFICE VISIT (OUTPATIENT)
Dept: BEHAVIORAL HEALTH | Facility: CLINIC | Age: 34
End: 2024-05-14
Payer: MEDICAID

## 2024-05-14 DIAGNOSIS — F84.0 AUTISM SPECTRUM DISORDER (HHS-HCC): ICD-10-CM

## 2024-05-14 DIAGNOSIS — F63.9 IMPULSE CONTROL DISORDER: ICD-10-CM

## 2024-05-14 DIAGNOSIS — F42.8 OBSESSIVE COMPULSIVE NEUROSIS: Primary | ICD-10-CM

## 2024-05-14 PROCEDURE — 1036F TOBACCO NON-USER: CPT | Performed by: PSYCHIATRY & NEUROLOGY

## 2024-05-14 PROCEDURE — 99214 OFFICE O/P EST MOD 30 MIN: CPT | Performed by: PSYCHIATRY & NEUROLOGY

## 2024-05-14 RX ORDER — LORAZEPAM 2 MG/1
TABLET ORAL
Qty: 5 TABLET | Refills: 0 | Status: SHIPPED | OUTPATIENT
Start: 2024-05-14

## 2024-05-14 RX ORDER — RAMELTEON 8 MG/1
8 TABLET ORAL NIGHTLY
COMMUNITY

## 2024-05-14 ASSESSMENT — ENCOUNTER SYMPTOMS
TREMORS: 0
VOMITING: 0
SEIZURES: 0
DIARRHEA: 0
FEVER: 0
SLEEP DISTURBANCE: 0
DIZZINESS: 0
POLYDIPSIA: 0
TROUBLE SWALLOWING: 0
UNEXPECTED WEIGHT CHANGE: 0
CONSTIPATION: 0
CHOKING: 0

## 2024-05-14 NOTE — PROGRESS NOTES
Outpatient Psychiatry    A HIPAA-compliant interactive audio and video telecommunication system which permits real time communications between the patient (at the originating site) and provider (at the distant site) was utilized to provide this telehealth service.     The patient, family, caregivers and guardian (as appropriate) have provided consent to conduct treatment via this telehealth service.  The patient's identity and physical location were verified at the time of this visit.      Present for appointment: Chris and mom/guardian (Martinez).    SUBJECTIVE    Had a recent consult with Hem[facility]Onc at [facility] due to chronic mild leukocytosis.  Has upcoming dental appointment at [facility].  No significant changes in O/C behaviors.  Still doing speech therapy one hour per week.  [facility] for his injections w/o problems.  No change in sleep patterns; PCP prescribed ramelteon.  Weight/appetite stable.     Review of Systems   Constitutional:  Negative for fever and unexpected weight change.   HENT:  Negative for drooling and trouble swallowing.    Respiratory:  Negative for choking.    Gastrointestinal:  Negative for constipation, diarrhea and vomiting.   Endocrine: Negative for polydipsia and polyuria.   Genitourinary:  Negative for enuresis.   Musculoskeletal:  Negative for gait problem.   Neurological:  Negative for dizziness, tremors, seizures and syncope.   Psychiatric/Behavioral:  Positive for behavioral problems. Negative for self-injury and sleep disturbance.      OARRS/PDMP reviewed: Nils Willingham MD on 5/14/2024  5:08 PM  I have personally reviewed the OARRS report for Chris Mehta.   I have considered the risks of abuse, dependence, addiction and diversion.    I believe that it is clinically appropriate for Chris Mehta to be prescribed this medication.    MEDICATION HISTORY  Bupropion  Buspirone - dosed up to 5mg twice daily w/o benefit  Fluoxetine - increased  irritability  Fluvoxamine - insomnia, behavioral activation  Quetiapine - no benefit  Risperidone - took for many years, caused significant weight gain & gynecomastia  Ritalin - increased agitation  Sertraline - insomnia, behavioral activation  Topiramate - irritability  Trazodone    SOCIAL HISTORY  Living situation Lives with family   Provider agency N/A   Work or day program None currently  Weekly ST at [facility] N/A   Guardianship Mother (Cristian [facility]on [facility]   Bx Specialist ?   Nicotine None   Alcohol None   Other drugs None     OBJECTIVE    Lab Results   Component Value Date    HGBA1C 5.8 11/27/2023     Therapeutic Drug Monitoring  07/20/2022: Ziprasidone level = 200 [ULN = 220] (320mg/day)    Electrocardiograms  None in chart    MENTAL STATUS EXAM  General/Appearance:  Shirtless.  Attitude/Behavior:  Avoids interactions. Tries to grab at mom.  Speech/Communication: Nonverbal.  Motor: Paces. Restless.  Gait: Ambulates w/o difficulty.  Mood: Appears upset/irritable.  Affect: Congruent.  Thought processes: Goal-directed.  Thought content: Unable to assess.  Perception: Does not appear to be experiencing or responding to hallucinations.  Sensorium/Cognition: Oriented to self and surroundings.  Memory: Not directly assessed at this time.  Insight: Absent.  Judgment: Poor.    ASSESSMENT  Continue current meds -- unclear if there are any other viable treatment options for O/C behaviors we could consider at this time.  Provided small script for as-needed lorazepam to use prior to upcoming dental appointment(s).    PROBLEM LIST  ASD  Impulse control disorder  OCD    PLAN  -- Continue Aristada 882mg IM Q4 weeks for impulse control   -- Continue Vivitrol (naltrexone) 380mg IM Q4 weeks for impulse control   -- Continue ziprasidone 160mg BID for impulse control  -- Follow up 2-3 months    Nils Willingham MD

## 2024-06-04 ENCOUNTER — PROCEDURE VISIT (OUTPATIENT)
Dept: BEHAVIORAL HEALTH | Facility: CLINIC | Age: 34
End: 2024-06-04
Payer: MEDICAID

## 2024-06-04 VITALS
TEMPERATURE: 97.7 F | SYSTOLIC BLOOD PRESSURE: 132 MMHG | HEIGHT: 67 IN | RESPIRATION RATE: 16 BRPM | HEART RATE: 107 BPM | DIASTOLIC BLOOD PRESSURE: 92 MMHG | WEIGHT: 281.5 LBS | BODY MASS INDEX: 44.18 KG/M2

## 2024-06-04 DIAGNOSIS — F63.9 IMPULSE CONTROL DISORDER: Primary | ICD-10-CM

## 2024-06-04 NOTE — PROGRESS NOTES
Patient here at the clinic for his monthly injections of vivitrol and Aristada for impulse control disorder      Patient identified by full name and , vital signs obtained. Patient last seen by provider on 24 and last seen by nurse on 24  Medication verified by providers note and medication order.     Appetite: no change  Sleep: broken  Appearance: clean, age appropriate, well-groomed  Build: obese   Attitude: cooperative, calm, pleasant, friendly  Eye Contact: avoidance   Activity: alert, attentive, appropriate  Speech: non-verbal  Delusions: mom denies  Thought Content: Intellectually disabled, non-verbal, but patient will follow simple commands  Thought Process: Intellectually disabled, non-verbal, but follows simple commands  Judgement/insight/ intellectually disabled but can distinguish between right and wrong  Mood: calm,happy   Affect: appropriate  AH/VH/SI/HI: mom denies  Access to firearms/weapons: No  Depression: mom denies  Thoughts of hopelessness: no evidence of this per mom  Anxiety: mom denies  Self-injurious behavior: mom denies  Cravings/Urges: NA  Last use: NA  Tobacco Use: non-smoker  Spiritual or cultural practices that may affect your care or impact your health care decisions? No  Living situation: Patient lives with his mother  Employed: No     Patient accompanied by mother today for monthly Vivitrol and Aristada IM injections. Patient is non-verbal but understands spoken words and able to answer yes and no questions by nodding his head.  Patient can also follow verbal instructions. Per mom,there has been no evidence of SI/HI, self harming behaviors, no new medications or recent hospitalizations to report at this time.     Patient received monthly aristada 882mg (3.2ml) via 20G in his right gluteal area with no issues at the injection site. Patient tolerated procedure well..      Lot: [id number]  Exp: 2025  NDC: 85830-940-80     Patient received Vivitrol 380mg via 20G into  left gluteal area. Patient tolerated injection well, no reaction at the injection site.     Lot #: [id number]  Exp: 31 OCT 2026  NDC: 99913-971-57     RN provided education on medication and side effects and the importance of maintaining monthly injection schedule. RN provided mom with an office number to call for any future questions and concerns. Mom was also provided with mobile crisis number for emergent situations.     Patient to RTC in 4 weeks     Anisha Corral RN, BSN

## 2024-07-02 ENCOUNTER — APPOINTMENT (OUTPATIENT)
Dept: BEHAVIORAL HEALTH | Facility: CLINIC | Age: 34
End: 2024-07-02
Payer: MEDICAID

## 2024-07-02 VITALS — HEART RATE: 89 BPM | TEMPERATURE: 97.8 F | RESPIRATION RATE: 16 BRPM

## 2024-07-02 DIAGNOSIS — F84.0 AUTISM SPECTRUM DISORDER (HHS-HCC): ICD-10-CM

## 2024-07-02 DIAGNOSIS — F63.9 IMPULSE CONTROL DISORDER: Primary | ICD-10-CM

## 2024-07-02 ASSESSMENT — PAIN SCALES - GENERAL: PAINLEVEL: 0-NO PAIN

## 2024-07-02 NOTE — PROGRESS NOTES
Patient here at the clinic for his monthly injections of vivitrol and Aristada for impulse control disorder and autism spectrum disorder.     Patient identified by full name and , vital signs obtained. Patient last seen by provider on 24 and last seen by nurse on 24 Medication verified by providers note and medication order.     Appetite: no change  Sleep: broken  Appearance: clean, age appropriate, well-groomed  Build: obese   Attitude: cooperative, calm, pleasant, friendly  Eye Contact: avoidance   Activity: alert, attentive, appropriate  Speech: non-verbal  Delusions: mom denies  Thought Content: Intellectually disabled, non-verbal, but patient will follow simple commands  Thought Process: Intellectually disabled, non-verbal, but follows simple commands  Judgement/insight/ intellectually disabled but can distinguish between right and wrong  Mood: calm,happy   Affect: appropriate  AH/VH/SI/HI: mom denies  Access to firearms/weapons: No  Depression: mom denies  Thoughts of hopelessness: no evidence of this per mom  Anxiety: mom denies  Self-injurious behavior: mom denies  Cravings/Urges: NA  Last use: NA  Tobacco Use: non-smoker  Spiritual or cultural practices that may affect your care or impact your health care decisions? No  Living situation: Patient lives with his mother  Employed: No     Patient accompanied by mother today for monthly Vivitrol and Aristada IM injections. Patient is non-verbal but understands spoken words and able to answer yes and no questions by nodding his head.  Patient can also follow verbal instructions. Per mom,there has been no evidence of SI/HI, self harming behaviors, no new medications or recent hospitalizations to report at this time.     Patient received monthly aristada 882mg (3.2ml) via 20G in his left gluteal area with no issues at the injection site. Patient tolerated procedure well..      Lot: [id number]  Exp: 31 MAR 2026  NDC: 02493-608-54     Patient received  Vivitrol 380mg via 20G into right gluteal area. Patient tolerated injection well, no reaction at the injection site.     Lot #: [id number]  Exp: 31 AUG 2026  NDC: 14617-158-62     RN provided education on medication and side effects and the importance of maintaining monthly injection schedule. RN provided mom with an office number to call for any future questions and concerns. Mom was also provided with mobile crisis number for emergent situations.     Patient to RTC in 4 weeks 7/30/24    Margot Gu RN, BSN

## 2024-07-30 ENCOUNTER — APPOINTMENT (OUTPATIENT)
Dept: BEHAVIORAL HEALTH | Facility: CLINIC | Age: 34
End: 2024-07-30
Payer: MEDICAID

## 2024-07-30 VITALS — DIASTOLIC BLOOD PRESSURE: 98 MMHG | TEMPERATURE: 97.7 F | HEART RATE: 98 BPM | SYSTOLIC BLOOD PRESSURE: 147 MMHG

## 2024-07-30 DIAGNOSIS — F63.9 IMPULSE CONTROL DISORDER: Primary | ICD-10-CM

## 2024-07-30 NOTE — PROGRESS NOTES
Patient here at the clinic for his monthly injections of vivitrol and Aristada for impulse control disorder      Patient identified by full name and , vital signs obtained. Patient last seen by provider on 24 and last seen by nurse on 24  Medication verified by providers note and medication order.     Appetite: no change  Sleep: broken  Appearance: clean, age appropriate, well-groomed  Build: obese   Attitude: cooperative, calm, pleasant, friendly  Eye Contact: avoidance   Activity: alert, attentive, appropriate  Speech: non-verbal  Delusions: mom denies  Thought Content: Intellectually disabled, non-verbal, but patient will follow simple commands  Thought Process: Intellectually disabled, non-verbal, but follows simple commands  Judgement/insight/ intellectually disabled but can distinguish between right and wrong  Mood: calm,happy   Affect: appropriate  AH/VH/SI/HI: mom denies  Access to firearms/weapons: No  Depression: mom denies  Thoughts of hopelessness: no evidence of this per mom  Anxiety: mom denies  Self-injurious behavior: mom denies  Cravings/Urges: NA  Last use: NA  Tobacco Use: non-smoker  Spiritual or cultural practices that may affect your care or impact your health care decisions? No  Living situation: Patient lives with his mother  Employed: No     Patient accompanied by mother today for monthly Vivitrol and Aristada IM injections. Patient is non-verbal but understands spoken words and able to answer yes and no questions by nodding his head.  Patient can also follow verbal instructions. Per mom,there has been no evidence of SI/HI, self harming behaviors, no new medications or recent hospitalizations to report at this time.     Patient received monthly aristada 882mg (3.2ml) via 20G in his right gluteal area with no issues at the injection site. Patient tolerated procedure well..      Lot: [id number]  Exp: 31 MAR 2026  NDC: 67519-266-56     Patient received Vivitrol 380mg via 20G into  left gluteal area. Patient tolerated injection well, no reaction at the injection site.     Lot #: [id number]  Exp: 31 DEC 2026  NDC: 53161-413-70     RN provided education on medication and side effects and the importance of maintaining monthly injection schedule. RN provided mom with an office number to call for any future questions and concerns. Mom was also provided with mobile crisis number for emergent situations.     Patient to RTC in 4 weeks     Anisha Corral RN, BSN

## 2024-08-14 ENCOUNTER — TELEPHONE (OUTPATIENT)
Dept: BEHAVIORAL HEALTH | Facility: CLINIC | Age: 34
End: 2024-08-14
Payer: MEDICAID

## 2024-08-27 ENCOUNTER — APPOINTMENT (OUTPATIENT)
Dept: BEHAVIORAL HEALTH | Facility: CLINIC | Age: 34
End: 2024-08-27
Payer: MEDICAID

## 2024-08-27 VITALS
HEART RATE: 87 BPM | RESPIRATION RATE: 18 BRPM | DIASTOLIC BLOOD PRESSURE: 80 MMHG | SYSTOLIC BLOOD PRESSURE: 131 MMHG | TEMPERATURE: 98 F

## 2024-08-27 DIAGNOSIS — F63.9 IMPULSE CONTROL DISORDER: Primary | ICD-10-CM

## 2024-08-27 ASSESSMENT — PAIN SCALES - GENERAL: PAINLEVEL: 0-NO PAIN

## 2024-09-24 ENCOUNTER — APPOINTMENT (OUTPATIENT)
Dept: BEHAVIORAL HEALTH | Facility: CLINIC | Age: 34
End: 2024-09-24
Payer: MEDICAID

## 2024-09-24 DIAGNOSIS — F84.0 AUTISM SPECTRUM DISORDER (HHS-HCC): Primary | ICD-10-CM

## 2024-09-24 DIAGNOSIS — F63.9 IMPULSE CONTROL DISORDER: ICD-10-CM

## 2024-09-24 NOTE — PROGRESS NOTES
Patient here at the clinic for his monthly injections of vivitrol and Aristada for impulse control disorder and Autism disorder     Patient identified by full name and , vital signs obtained. Patient last seen by provider on 24 and last seen by nurse on 2024  Medication verified by providers note and medication order.     Appetite: no change  Sleep: broken  Appearance: clean, age appropriate, well-groomed  Build: obese   Attitude: cooperative, calm, pleasant, friendly  Eye Contact: avoidance   Activity: alert, attentive, appropriate  Speech: non-verbal  Delusions: mom denies  Thought Content: Intellectually disabled, non-verbal, but patient will follow simple commands  Thought Process: Intellectually disabled, non-verbal, but follows simple commands  Judgement/insight/ intellectually disabled but can distinguish between right and wrong  Mood: calm,happy   Affect: appropriate  AH/VH/SI/HI: mom denies  Access to firearms/weapons: No  Depression: mom denies  Thoughts of hopelessness: no evidence of this per mom  Anxiety: mom denies  Self-injurious behavior: mom denies  Cravings/Urges: NA  Last use: NA  Tobacco Use: non-smoker  Spiritual or cultural practices that may affect your care or impact your health care decisions? No  Living situation: Patient lives with his mother  Employed: No     Patient accompanied by mother today for monthly Vivitrol and Aristada IM injections. Patient is non-verbal but able to answer yes and no questions by nodding his head.  Patient can also follow verbal instructions. Per mom,there has been no evidence of SI/HI, self harming behaviors, no new medications or recent hospitalizations to report at this time.     Patient received monthly aristada 882mg (3.2ml) via 20G in his right gluteal area with no issues at the injection site. Patient tolerated procedure well..      Lot: -[id number]  Exp: 2026  NDC: 05067-224-53     Patient received Vivitrol 380mg via 20G into left  gluteal area. Patient tolerated injection well, no reaction at the injection site.     Lot #: [id number]  Exp: 31JAN 2027  NDC: 01681-535-16     RN provided education on medication and side effects and the importance of maintaining monthly injection schedule. RN provided mom with an office number to call for any future questions and concerns. Mom was also provided with mobile crisis number for emergent situations.     Patient to RTC in 4 weeks     Margot Gu RN, BSN

## 2024-10-06 DIAGNOSIS — F63.9 IMPULSE CONTROL DISORDER: ICD-10-CM

## 2024-10-07 DIAGNOSIS — F63.9 IMPULSE CONTROL DISORDER: Primary | ICD-10-CM

## 2024-10-07 RX ORDER — ZIPRASIDONE HYDROCHLORIDE 80 MG/1
160 CAPSULE ORAL 2 TIMES DAILY
Qty: 360 CAPSULE | Refills: 3 | Status: SHIPPED | OUTPATIENT
Start: 2024-10-07

## 2024-10-07 RX ORDER — ARIPIPRAZOLE LAUROXIL 882 MG/3.2ML
882 INJECTION, SUSPENSION, EXTENDED RELEASE INTRAMUSCULAR
Qty: 3.2 ML | Refills: 12 | Status: SHIPPED | OUTPATIENT
Start: 2024-10-07

## 2024-10-07 RX ORDER — ZIPRASIDONE HYDROCHLORIDE 80 MG/1
CAPSULE ORAL
Qty: 120 CAPSULE | Refills: 11 | OUTPATIENT
Start: 2024-10-07

## 2024-10-07 RX ORDER — NALTREXONE 380 MG
380 KIT INTRAMUSCULAR
Qty: 4 ML | Refills: 12 | Status: SHIPPED | OUTPATIENT
Start: 2024-10-07

## 2024-10-22 ENCOUNTER — APPOINTMENT (OUTPATIENT)
Dept: BEHAVIORAL HEALTH | Facility: CLINIC | Age: 34
End: 2024-10-22
Payer: MEDICAID

## 2024-10-22 VITALS
RESPIRATION RATE: 18 BRPM | SYSTOLIC BLOOD PRESSURE: 127 MMHG | TEMPERATURE: 97.8 F | HEART RATE: 88 BPM | DIASTOLIC BLOOD PRESSURE: 87 MMHG

## 2024-10-22 DIAGNOSIS — F63.9 IMPULSE CONTROL DISORDER: Primary | ICD-10-CM

## 2024-10-22 DIAGNOSIS — F84.0 AUTISM SPECTRUM DISORDER (HHS-HCC): ICD-10-CM

## 2024-10-22 ASSESSMENT — PAIN SCALES - GENERAL: PAINLEVEL_OUTOF10: 0-NO PAIN

## 2024-10-22 NOTE — PROGRESS NOTES
Patient here at the clinic for his monthly injections of vivitrol and Aristada for impulse control disorder and Autism disorder     Patient identified by full name and , vital signs obtained. Patient last seen by provider on 2024 and last seen by nurse on 24  Medication verified by providers note and medication order.     Appetite: no change  Sleep: broken  Appearance: clean, age appropriate, well-groomed  Build: obese   Attitude: cooperative, calm, pleasant, friendly  Eye Contact: avoidance   Activity: alert, attentive, appropriate  Speech: non-verbal  Delusions: mom denies  Thought Content: Intellectually disabled, non-verbal, but patient will follow simple commands  Thought Process: Intellectually disabled, non-verbal, but follows simple commands  Judgement/insight/ intellectually disabled but can distinguish between right and wrong  Mood: calm,happy   Affect: appropriate  AH/VH/SI/HI: mom denies  Access to firearms/weapons: No  Depression: mom denies  Thoughts of hopelessness: no evidence of this per mom  Anxiety: mom denies  Self-injurious behavior: mom denies  Cravings/Urges: NA  Last use: NA  Tobacco Use: non-smoker  Spiritual or cultural practices that may affect your care or impact your health care decisions? No  Living situation: Patient lives with his mother  Employed: No     Patient accompanied by mother today for monthly Vivitrol and Aristada IM injections. Patient is non-verbal but able to answer yes and no questions by nodding his head.  Patient can also follow verbal instructions. Per mom,there has been no evidence of SI/HI, self harming behaviors, no new medications or recent hospitalizations to report at this time.     Patient received monthly aristada 882mg (3.2ml) via 20G in his left gluteal area with no issues at the injection site. Patient tolerated procedure well..      Lot: -[id number]  Exp: 2026  NDC: 30281-657-99     Patient received Vivitrol 380mg via 20G into right  gluteal area. Patient tolerated injection well, no reaction at the injection site.     Lot #: [id number]  Exp: 31MAY 2027  NDC: 66949-498-30     RN provided education on medication and side effects and the importance of maintaining monthly injection schedule. RN provided mom with an office number to call for any future questions and concerns. Mom was also provided with mobile crisis number for emergent situations.     Patient to RTC in 4 weeks     Margot Gu RN, BSN

## 2024-11-19 ENCOUNTER — APPOINTMENT (OUTPATIENT)
Dept: BEHAVIORAL HEALTH | Facility: CLINIC | Age: 34
End: 2024-11-19
Payer: MEDICAID

## 2024-11-20 ENCOUNTER — PROCEDURE VISIT (OUTPATIENT)
Dept: BEHAVIORAL HEALTH | Facility: CLINIC | Age: 34
End: 2024-11-20
Payer: MEDICAID

## 2024-11-20 DIAGNOSIS — F84.0 AUTISM SPECTRUM DISORDER (HHS-HCC): ICD-10-CM

## 2024-11-20 DIAGNOSIS — F63.9 IMPULSE CONTROL DISORDER: Primary | ICD-10-CM

## 2024-11-20 NOTE — PROGRESS NOTES
Patient here at the clinic for his monthly injections of vivitrol and Aristada for impulse control disorder and Autism disorder     Patient identified by full name and , vital signs obtained. Patient last seen by provider on 10/7/24 and last seen by nurse on 10/22/24  Medication verified by providers note and medication order.     Appetite: no change  Sleep: broken  Appearance: clean, age appropriate, well-groomed  Build: obese   Attitude: cooperative, calm, pleasant, friendly  Eye Contact: avoidance   Activity: alert, attentive, appropriate  Speech: non-verbal  Delusions: mom denies  Thought Content: Intellectually disabled, non-verbal, but patient will follow simple commands  Thought Process: Intellectually disabled, non-verbal, but follows simple commands  Judgement/insight/ intellectually disabled but can distinguish between right and wrong  Mood: calm,happy   Affect: appropriate  AH/VH/SI/HI: mom denies  Access to firearms/weapons: No  Depression: mom denies  Thoughts of hopelessness: no evidence of this per mom  Anxiety: mom denies  Self-injurious behavior: mom denies  Cravings/Urges: NA  Last use: NA  Tobacco Use: non-smoker  Spiritual or cultural practices that may affect your care or impact your health care decisions? No  Living situation: Patient lives with his mother  Employed: No     Patient accompanied by mother today for monthly Vivitrol and Aristada IM injections. Patient is non-verbal but able to answer yes and no questions by nodding his head.  Patient can also follow verbal instructions. Per mom,there has been no evidence of SI/HI, self harming behaviors, no new medications or recent hospitalizations to report at this time.     Patient received monthly aristada 882mg (3.2ml) via 20G in his right gluteal area with no issues at the injection site. Patient tolerated procedure well..      Lot: [id number]  Exp: [id number]  NDC: 65230-656-16     Patient received Vivitrol 380mg via 20G into left gluteal  area. Patient tolerated injection well, no reaction at the injection site.     Lot #: [id number]  Exp: 31MAY 2027  NDC: 27915-338-05     RN provided education on medication and side effects and the importance of maintaining monthly injection schedule. RN provided mom with an office number to call for any future questions and concerns. Mom was also provided with mobile crisis number for emergent situations.     Patient to RTC in 4 weeks     Margot Gu RN, BSN

## 2024-12-17 ENCOUNTER — APPOINTMENT (OUTPATIENT)
Dept: BEHAVIORAL HEALTH | Facility: CLINIC | Age: 34
End: 2024-12-17
Payer: MEDICAID

## 2024-12-17 VITALS
SYSTOLIC BLOOD PRESSURE: 131 MMHG | HEART RATE: 72 BPM | RESPIRATION RATE: 16 BRPM | TEMPERATURE: 97.4 F | DIASTOLIC BLOOD PRESSURE: 94 MMHG

## 2024-12-17 DIAGNOSIS — F63.9 IMPULSE CONTROL DISORDER: Primary | ICD-10-CM

## 2024-12-17 NOTE — PROGRESS NOTES
Patient here at the clinic accompanied by mom for his monthly injections of vivitrol and Aristada for impulse control disorder      Patient identified by full name and , vital signs obtained. Patient last seen by provider on 24 and last seen by nurse on 24  Medication verified by providers note and medication order.     Appetite: no change  Sleep: no change  Appearance: clean, age appropriate, well-groomed  Build: obese   Attitude: cooperative, calm, pleasant, friendly  Eye Contact: avoidance   Activity: alert, attentive, appropriate  Speech: non-verbal  Delusions: mom denies  Thought Content: Intellectually disabled, non-verbal, but patient will follow simple commands  Thought Process: Intellectually disabled, non-verbal, but follows simple commands  Judgement/insight/ intellectually disabled but can distinguish between right and wrong  Mood: calm,happy   Affect: appropriate  AH/VH/SI/HI: mom denies  Access to firearms/weapons: No  Depression: mom denies  Thoughts of hopelessness: no evidence of this per mom  Anxiety: mom denies  Self-injurious behavior: mom denies  Cravings/Urges: NA  Last use: NA  Tobacco Use: non-smoker  Spiritual or cultural practices that may affect your care or impact your health care decisions? No  Living situation: Patient lives with his mother  Employed: No      Patient is non-verbal but understands spoken words and able to answer yes and no questions by nodding his head.  Patient can also follow verbal instructions. No evidence of SI/HI, self harming behaviors per mom and  no new medications or recent hospitalizations to report at this time. Diastolic BP was high today,94. Mom stated that he will discuss the high BP with patient's pcp.     Patient received Aristada 882mg/3.2ml via 20G,1 and 1/2 needle  in his left gluteal area with no issues at the injection site. Patient tolerated the procedure well..      Lot: [id number]  Exp: 31 OCT 2026  NDC: 77299-129-09     Patient  received Vivitrol 380mg via 20G,1 and 1/2 needle into right gluteal area. Patient tolerated injection well, no reaction at the injection site.     Lot #: [id number]  Exp: 30 JUN 2027  NDC: 32035-963-74     RN provided education on medication and side effects and the importance of maintaining monthly injection schedule to patients's mom. RN provided mom with an office number to call for any future questions and concerns. Mom was also provided with mobile crisis number for emergent situations.     Patient to RTC in 4 weeks     Anisha Corral RN, BSN      Bladder non-tender and non-distended. Urine clear yellow.

## 2025-01-06 ENCOUNTER — TELEPHONE (OUTPATIENT)
Dept: OTHER | Age: 35
End: 2025-01-06
Payer: MEDICAID

## 2025-01-08 NOTE — PROGRESS NOTES
Medication has been approved, mom and pharmacy made aware.      Medication has been approved    PROVIDER: Tarsha   MEDICATION/DOSAGE:  ziprasidone (Geodon) 80 mg   QTY/SUPPLY: 360/90  PRIOR AUTH COMPLETED VIA:  INSURANCE:  [employer]   REF #/KEY: No Key done over phone  STATUS approved  BIN#      [id number]       ID#      [id number]

## 2025-01-14 ENCOUNTER — APPOINTMENT (OUTPATIENT)
Dept: BEHAVIORAL HEALTH | Facility: CLINIC | Age: 35
End: 2025-01-14
Payer: MEDICAID

## 2025-01-14 VITALS — TEMPERATURE: 98.5 F

## 2025-01-14 DIAGNOSIS — F63.9 IMPULSE CONTROL DISORDER: Primary | ICD-10-CM

## 2025-01-14 NOTE — PROGRESS NOTES
Patient here at the clinic accompanied by mom for his monthly injections of vivitrol and Aristada for impulse control disorder      Patient identified by full name and , temperature obtained. Patient last seen by provider on 24 and last seen by nurse on 24  Medication verified by providers note and medication order.     Appetite: no change  Sleep: no change  Appearance: clean, age appropriate, well-groomed  Build: obese   Attitude: cooperative, calm, pleasant, friendly  Eye Contact: avoidance   Activity: alert, attentive, appropriate  Speech: non-verbal  Delusions: mom denies  Thought Content: Intellectually disabled, non-verbal, but patient will follow simple commands  Thought Process: Intellectually disabled, non-verbal, but follows simple commands  Judgement/insight/ intellectually disabled but can distinguish between right and wrong  Mood: calm,happy   Affect: appropriate  AH/VH/SI/HI: mom denies  Access to firearms/weapons: No  Depression: mom denies  Thoughts of hopelessness: no evidence of this per mom  Anxiety: mom denies  Self-injurious behavior: mom denies  Cravings/Urges: NA  Last use: NA  Tobacco Use: non-smoker  Spiritual or cultural practices that may affect your care or impact your health care decisions? No  Living situation: Patient lives with his mother  Employed: No      Patient is non-verbal but understands spoken words and able to answer yes and no questions by nodding his head.  Patient can also follow verbal instructions. No evidence of SI/HI, self harming behaviors per mom and  no new medications or recent hospitalizations to report at this time. Unable to obtain BP today because patient kept on moving his arm. Wanted patient and mom to go with me to my office for a manual BP but mom declined stating that her mom is waiting outside in the car.     Patient received Aristada 882mg/3.2ml via 20G,1 and 1/2 needle  in his right gluteal area. Patient tolerated the procedure well.Mom  advised to give patient tylenol this afternoon due to x2 attempt in giving the injection      Lot: [id number]  Exp: 31 OCT 2026  NDC: 20589-389-96     Patient received Vivitrol 380mg via 20G,1 and 1/2 needle into left gluteal area. Patient tolerated the procedure well.     Lot #: [id number]   Exp: 31 AUG 2027  NDC: 76885-850-44     RN provided education to patient's mom on medication and side effects and the importance of maintaining monthly injection schedule. RN provided mom with an office number to call for any future questions and concerns. Mom was also provided with mobile crisis number for emergent situations.     Patient to RTC in 4 weeks     Anisha Corral RN, BSN

## 2025-02-10 ENCOUNTER — APPOINTMENT (OUTPATIENT)
Dept: BEHAVIORAL HEALTH | Facility: CLINIC | Age: 35
End: 2025-02-10
Payer: MEDICAID

## 2025-02-10 VITALS
DIASTOLIC BLOOD PRESSURE: 86 MMHG | SYSTOLIC BLOOD PRESSURE: 130 MMHG | RESPIRATION RATE: 18 BRPM | HEART RATE: 88 BPM | TEMPERATURE: 97.8 F

## 2025-02-10 DIAGNOSIS — F63.9 IMPULSE CONTROL DISORDER: Primary | ICD-10-CM

## 2025-02-10 ASSESSMENT — COLUMBIA-SUICIDE SEVERITY RATING SCALE - C-SSRS
1. HAVE YOU WISHED YOU WERE DEAD OR WISHED YOU COULD GO TO SLEEP AND NOT WAKE UP?: NO
TOTAL  NUMBER OF INTERRUPTED ATTEMPTS SINCE LAST CONTACT: NO
SUICIDE, SINCE LAST CONTACT: NO
2. HAVE YOU ACTUALLY HAD ANY THOUGHTS OF KILLING YOURSELF?: NO
ATTEMPT SINCE LAST CONTACT: NO
TOTAL  NUMBER OF ABORTED OR SELF INTERRUPTED ATTEMPTS LIFETIME: NO
6. HAVE YOU EVER DONE ANYTHING, STARTED TO DO ANYTHING, OR PREPARED TO DO ANYTHING TO END YOUR LIFE?: NO
2. HAVE YOU ACTUALLY HAD ANY THOUGHTS OF KILLING YOURSELF?: NO
1. SINCE LAST CONTACT, HAVE YOU WISHED YOU WERE DEAD OR WISHED YOU COULD GO TO SLEEP AND NOT WAKE UP?: NO
6. HAVE YOU EVER DONE ANYTHING, STARTED TO DO ANYTHING, OR PREPARED TO DO ANYTHING TO END YOUR LIFE?: NO
TOTAL  NUMBER OF ABORTED OR SELF INTERRUPTED ATTEMPTS SINCE LAST CONTACT: NO
TOTAL  NUMBER OF INTERRUPTED ATTEMPTS LIFETIME: NO
ATTEMPT LIFETIME: NO

## 2025-02-10 ASSESSMENT — PAIN SCALES - GENERAL: PAINLEVEL_OUTOF10: 0-NO PAIN

## 2025-02-10 NOTE — PROGRESS NOTES
Patient here at the clinic accompanied by mom for his monthly injections of vivitrol and Aristada for impulse control disorder      Patient identified by full name and , temperature obtained. Patient last seen by provider on 24 and last seen by nurse on 25  Medication verified by providers note and medication order.     Appetite: no change  Sleep: no change  Appearance: clean, age appropriate, well-groomed  Build: obese   Attitude: cooperative, calm, pleasant, friendly  Eye Contact: avoidance   Activity: alert, attentive, appropriate  Speech: non-verbal  Delusions: mom denies  Thought Content: Intellectually disabled, non-verbal, but patient will follow simple commands  Thought Process: Intellectually disabled, non-verbal, but follows simple commands  Judgement/insight/ intellectually disabled but can distinguish between right and wrong  Mood: calm,happy   Affect: appropriate  AH/VH/SI/HI: mom denies  Access to firearms/weapons: No  Depression: mom denies  Thoughts of hopelessness: no evidence of this per mom  Anxiety: mom denies  Self-injurious behavior: mom denies  Cravings/Urges: NA  Last use: NA  Tobacco Use: non-smoker  Spiritual or cultural practices that may affect your care or impact your health care decisions? No  Living situation: Patient lives with his mother  Employed: No      Patient is non-verbal but able to understand and able to  answer yes and no questions by nodding his head.  Patient can also follow verbal instructions. No evidence of SI/HI, self harming behaviors per mom and  no new medications or recent hospitalizations to report at this time.     Patient received Aristada 882mg/3.2ml via 20G,1 and 1/2 needle  in his left gluteal area. Patient tolerated the procedure well.      Lot: [id number]  Exp: 31 OCT 2026  NDC: 06429-552-67     Patient received Vivitrol 380mg via 20G,1 and 1/2 needle into right gluteal area. Patient tolerated the procedure well.     Lot #: [id number]   Exp:   AUG 2027  NDC: 14895-363-21     RN provided education to patient's mom on medication and side effects and the importance of maintaining monthly injection schedule. RN provided mom with an office number to call for any future questions and concerns. Mom was also provided with mobile crisis number for emergent situations.     Patient to RTC in 4 weeks     Margot Gu RN, BSN

## 2025-03-11 ENCOUNTER — APPOINTMENT (OUTPATIENT)
Dept: BEHAVIORAL HEALTH | Facility: CLINIC | Age: 35
End: 2025-03-11
Payer: MEDICAID

## 2025-03-11 VITALS
TEMPERATURE: 97.9 F | RESPIRATION RATE: 18 BRPM | HEART RATE: 98 BPM | DIASTOLIC BLOOD PRESSURE: 80 MMHG | SYSTOLIC BLOOD PRESSURE: 131 MMHG

## 2025-03-11 DIAGNOSIS — F63.9 IMPULSE CONTROL DISORDER: ICD-10-CM

## 2025-03-11 DIAGNOSIS — F84.0 AUTISM SPECTRUM DISORDER (HHS-HCC): ICD-10-CM

## 2025-03-11 ASSESSMENT — COLUMBIA-SUICIDE SEVERITY RATING SCALE - C-SSRS
1. SINCE LAST CONTACT, HAVE YOU WISHED YOU WERE DEAD OR WISHED YOU COULD GO TO SLEEP AND NOT WAKE UP?: NO
6. HAVE YOU EVER DONE ANYTHING, STARTED TO DO ANYTHING, OR PREPARED TO DO ANYTHING TO END YOUR LIFE?: NO
SUICIDE, SINCE LAST CONTACT: NO
ATTEMPT SINCE LAST CONTACT: NO
TOTAL  NUMBER OF INTERRUPTED ATTEMPTS LIFETIME: NO
2. HAVE YOU ACTUALLY HAD ANY THOUGHTS OF KILLING YOURSELF?: NO
TOTAL  NUMBER OF INTERRUPTED ATTEMPTS SINCE LAST CONTACT: NO
2. HAVE YOU ACTUALLY HAD ANY THOUGHTS OF KILLING YOURSELF?: NO
ATTEMPT LIFETIME: NO
TOTAL  NUMBER OF ABORTED OR SELF INTERRUPTED ATTEMPTS SINCE LAST CONTACT: NO
6. HAVE YOU EVER DONE ANYTHING, STARTED TO DO ANYTHING, OR PREPARED TO DO ANYTHING TO END YOUR LIFE?: NO
TOTAL  NUMBER OF ABORTED OR SELF INTERRUPTED ATTEMPTS LIFETIME: NO
1. HAVE YOU WISHED YOU WERE DEAD OR WISHED YOU COULD GO TO SLEEP AND NOT WAKE UP?: NO

## 2025-03-11 ASSESSMENT — PAIN SCALES - GENERAL: PAINLEVEL_OUTOF10: 0-NO PAIN

## 2025-03-11 NOTE — PROGRESS NOTES
Patient here at the clinic accompanied by mom for his monthly injections of vivitrol and Aristada for impulse control disorder      Patient identified by full name and , temperature obtained. Patient last seen by provider on 24 and last seen by nurse on 2/10/25  Medication verified by providers note and medication order.     Appetite: no change  Sleep: no change  Appearance: clean, age appropriate, well-groomed  Build: obese   Attitude: cooperative, calm, pleasant, friendly  Eye Contact: avoidance   Activity: alert, attentive, appropriate  Speech: non-verbal  Delusions: mom denies  Thought Content: Intellectually disabled, non-verbal, but patient will follow simple commands  Thought Process: Intellectually disabled, non-verbal, but follows simple commands  Judgement/insight/ intellectually disabled but can distinguish between right and wrong  Mood: calm,happy   Affect: appropriate  AH/VH/SI/HI: mom denies  Access to firearms/weapons: No  Depression: mom denies  Thoughts of hopelessness: no evidence of this per mom  Anxiety: mom denies  Self-injurious behavior: mom denies  Cravings/Urges: NA  Last use: NA  Tobacco Use: non-smoker  Spiritual or cultural practices that may affect your care or impact your health care decisions? No  Living situation: Patient lives with his mother  Employed: No      Patient is non-verbal but able to understand and able to  answer yes and no questions by nodding his head.  Patient can also follow verbal instructions. No evidence of SI/HI, self harming behaviors per mom and  no new medications or recent hospitalizations to report at this time.  Per mom patient continues to display his OCD behaviors, but no signs or symptoms of AH/VH, paranoia, or SI/HI at this time.     Patient received Aristada 882mg/3.2ml via 20G,1 and 1/2 needle  in his right gluteal area. Patient tolerated the procedure well.      Lot: [id number]  Exp:   NDC: 96660-522-81     Patient received Vivitrol  380mg via 20G,1 and 1/2 needle into left gluteal area. Patient tolerated the procedure well.     Lot #: [id number]  Exp: 31 OCT 2027  NDC: 88843-257-13     RN provided education to patient's mom on medication and side effects and the importance of maintaining monthly injection schedule. RN provided mom with an office number to call for any future questions and concerns. Mom was also provided with mobile crisis number for emergent situations.     Patient to RTC in 4 weeks     Margot Gu RN, BSN

## 2025-04-08 ENCOUNTER — APPOINTMENT (OUTPATIENT)
Dept: BEHAVIORAL HEALTH | Facility: CLINIC | Age: 35
End: 2025-04-08
Payer: MEDICAID

## 2025-04-08 VITALS
HEART RATE: 98 BPM | BODY MASS INDEX: 44.14 KG/M2 | WEIGHT: 281.8 LBS | SYSTOLIC BLOOD PRESSURE: 133 MMHG | DIASTOLIC BLOOD PRESSURE: 87 MMHG | RESPIRATION RATE: 18 BRPM | TEMPERATURE: 98.7 F

## 2025-04-08 DIAGNOSIS — F63.9 IMPULSE CONTROL DISORDER: Primary | ICD-10-CM

## 2025-04-08 ASSESSMENT — PAIN SCALES - GENERAL: PAINLEVEL_OUTOF10: 0-NO PAIN

## 2025-04-08 NOTE — PROGRESS NOTES
Patient here at the clinic accompanied by mom for his monthly injections of vivitrol and Aristada for impulse control disorder      Patient identified by full name and , temperature obtained. Patient last seen by provider on 24 and last seen by nurse on 3/11/25  Medication verified by providers note and medication order.     Appetite: no change  Sleep: no change  Appearance: clean, age appropriate, well-groomed  Build: obese   Attitude: cooperative, calm, pleasant, friendly  Eye Contact: avoidance   Activity: alert, attentive, appropriate  Speech: non-verbal  Delusions: mom denies  Thought Content: Intellectually disabled, non-verbal, but patient will follow simple commands  Thought Process: Intellectually disabled, non-verbal, but follows simple commands  Judgement/insight/ intellectually disabled but can distinguish between right and wrong  Mood: calm,happy   Affect: appropriate  AH/VH/SI/HI: mom denies  Access to firearms/weapons: No  Depression: mom denies  Thoughts of hopelessness: no evidence of this per mom  Anxiety: mom denies  Self-injurious behavior: mom denies  Cravings/Urges: NA  Last use: NA  Tobacco Use: non-smoker  Spiritual or cultural practices that may affect your care or impact your health care decisions? No  Living situation: Patient lives with his mother  Employed: No      Patient is non-verbal but able to understand and able to  answer yes and no questions by nodding his head.  Patient can also follow verbal instructions. No evidence of SI/HI, self harming behaviors per mom and  no new medications or recent hospitalizations to report at this time.  Per mom patient continues to display his OCD behaviors, but no signs or symptoms of AH/VH, paranoia, or SI/HI at this time.     Patient received Aristada 882mg/3.2ml via 20G,1 and 1/2 needle  in his left gluteal area. Patient tolerated the procedure well.      Lot: [id number]  Exp: 30 [id number]  NDC: 21409-549-12     Patient received Vivitrol  380mg via 20G,1 and 1/2 needle into right gluteal area. Patient tolerated the procedure well.     Lot #: [id number]  Exp: [id number]  NDC: 71936-876-43     RN provided education to patient's mom on medication and side effects and the importance of maintaining monthly injection schedule. RN provided mom with an office number to call for any future questions and concerns. Mom was also provided with mobile crisis number for emergent situations.     Patient to RTC in 4 weeks     Margot Gu RN, BSN

## 2025-05-06 ENCOUNTER — APPOINTMENT (OUTPATIENT)
Dept: BEHAVIORAL HEALTH | Facility: CLINIC | Age: 35
End: 2025-05-06
Payer: MEDICAID

## 2025-05-06 VITALS
DIASTOLIC BLOOD PRESSURE: 79 MMHG | SYSTOLIC BLOOD PRESSURE: 130 MMHG | WEIGHT: 277 LBS | RESPIRATION RATE: 18 BRPM | HEART RATE: 88 BPM | BODY MASS INDEX: 43.38 KG/M2 | TEMPERATURE: 97 F

## 2025-05-06 DIAGNOSIS — F63.9 IMPULSE CONTROL DISORDER: Primary | ICD-10-CM

## 2025-05-06 ASSESSMENT — PAIN SCALES - GENERAL: PAINLEVEL_OUTOF10: 0-NO PAIN

## 2025-05-06 NOTE — PROGRESS NOTES
Patient here at the clinic accompanied by mom for his monthly injections of vivitrol and Aristada for impulse control disorder      Patient identified by full name and , temperature obtained. Patient last seen by provider on 24 and last seen by nurse on 25  Medication verified by providers note and medication order.     Appetite: no change  Sleep: no change  Appearance: clean, age appropriate, well-groomed  Build: obese   Attitude: cooperative, calm, pleasant, friendly  Eye Contact: avoidance   Activity: alert, attentive, appropriate  Speech: non-verbal  Delusions: mom denies  Thought Content: Intellectually disabled, non-verbal, but patient will follow simple commands  Thought Process: Intellectually disabled, non-verbal, but follows simple commands  Judgement/insight/ intellectually disabled but can distinguish between right and wrong  Mood: calm,happy   Affect: appropriate  AH/VH/SI/HI: mom denies  Access to firearms/weapons: No  Depression: mom denies  Thoughts of hopelessness: no evidence of this per mom  Anxiety: mom denies  Self-injurious behavior: mom denies  Cravings/Urges: NA  Last use: NA  Tobacco Use: non-smoker  Spiritual or cultural practices that may affect your care or impact your health care decisions? No  Living situation: Patient lives with his mother  Employed: No      Patient is non-verbal but able to understand and able to  answer yes and no questions by nodding his head.  Patient can also follow verbal instructions. No evidence of SI/HI, self harming behaviors per mom and  no new medications or recent hospitalizations to report at this time.  Per mom patient continues to display his OCD behaviors, but no signs or symptoms of AH/VH, paranoia, or SI/HI at this time.     Patient received Aristada 882mg/3.2ml via 20G,1 and 1/2 needle  in his  gluteal area. Patient tolerated the procedure well.      Lot: [id number]  Exp: [id number]  NDC: 35737-382-49     Patient received Vivitrol  380mg via 20G,1 and 1/2 needle into left gluteal area. Patient tolerated the procedure well.     Lot #: [id number]  Exp: [id number]  NDC: 09504-346-82     RN provided education to patient's mom on medication and side effects and the importance of maintaining monthly injection schedule. RN provided mom with an office number to call for any future questions and concerns. Mom was also provided with mobile crisis number for emergent situations.     Patient to RTC in 4 weeks     Margot Gu RN, BSN

## 2025-06-03 ENCOUNTER — APPOINTMENT (OUTPATIENT)
Dept: BEHAVIORAL HEALTH | Facility: CLINIC | Age: 35
End: 2025-06-03
Payer: MEDICAID

## 2025-06-03 VITALS — HEART RATE: 90 BPM | TEMPERATURE: 97.6 F | RESPIRATION RATE: 16 BRPM

## 2025-06-03 DIAGNOSIS — F63.9 IMPULSE CONTROL DISORDER: ICD-10-CM

## 2025-06-03 NOTE — PROGRESS NOTES
Patient here at the clinic accompanied by mom for his monthly injections of vivitrol and Aristada for impulse control disorder      Patient identified by full name and , temperature,pulse and respiration  obtained. Patient last seen by provider on 24 and last seen by nurse on 25  Medication verified by providers note and medication order.     Appetite: no change  Sleep: no change  Appearance: clean, age appropriate, well-groomed  Build: obese   Attitude: cooperative, calm, pleasant, friendly  Eye Contact: avoidance   Activity: alert, attentive, appropriate  Speech: non-verbal  Delusions: mom denies  Thought Content: Intellectually disabled, non-verbal, but patient will follow simple commands  Thought Process: Intellectually disabled, non-verbal, but follows simple commands  Judgement/insight/ intellectually disabled but can distinguish between right and wrong  Mood: calm,happy   Affect: appropriate  AH/VH/SI/HI: mom denies  Access to firearms/weapons: No  Depression: mom denies  Thoughts of hopelessness: no evidence of this per mom  Anxiety: mom denies  Self-injurious behavior: mom denies  Cravings/Urges: NA  Last use: NA  Tobacco Use: non-smoker  Spiritual or cultural practices that may affect your care or impact your health care decisions? No  Living situation: Patient lives with his mother  Employed: No      Patient is non-verbal but able to understand and  answer yes and no questions by nodding his head.  Patient can also follow verbal instructions. No evidence of SI/HI, self harming behaviors per mom and  no new medications or recent hospitalizations to report at this time.  Per mom patient continues to display his OCD behaviors, but no signs or symptoms of AH/VH, paranoia, or SI/HI at this time.     Patient received Aristada 882mg/3.2ml via 20G,1 and 1/2 needle  in his left gluteal area. Patient tolerated the procedure well.      Lot: [id number]  Exp: [id number]  NDC: 35467-667-55     Patient  received Vivitrol 380mg via 20G,1 and 1/2 needle into right gluteal area. Patient tolerated the procedure well.     Lot #: [id number]  Exp: [id number]  NDC: 12835-809-96     RN provided education to patient's mom on medication and side effects and the importance of maintaining monthly injection schedule. RN provided mom with an office number to call for any future questions and concerns. Mom also informed to schedule a follow up appt with pt's provider and aware to call 911 or mobile crisis number for emergent situations.     Patient to RTC in 4 weeks     Anisha Corral, RN, BSN

## 2025-07-01 ENCOUNTER — APPOINTMENT (OUTPATIENT)
Dept: BEHAVIORAL HEALTH | Facility: CLINIC | Age: 35
End: 2025-07-01
Payer: MEDICAID

## 2025-07-01 VITALS
TEMPERATURE: 97.8 F | RESPIRATION RATE: 18 BRPM | DIASTOLIC BLOOD PRESSURE: 84 MMHG | WEIGHT: 277 LBS | BODY MASS INDEX: 43.38 KG/M2 | HEART RATE: 88 BPM | SYSTOLIC BLOOD PRESSURE: 131 MMHG

## 2025-07-01 DIAGNOSIS — F63.9 IMPULSE CONTROL DISORDER: ICD-10-CM

## 2025-07-01 DIAGNOSIS — F63.9 IMPULSE CONTROL DISORDER: Primary | ICD-10-CM

## 2025-07-01 DIAGNOSIS — F84.0 AUTISM SPECTRUM DISORDER (HHS-HCC): ICD-10-CM

## 2025-07-01 RX ORDER — POTASSIUM CHLORIDE 750 MG/1
10 TABLET, EXTENDED RELEASE ORAL DAILY
COMMUNITY

## 2025-07-01 RX ORDER — ZIPRASIDONE HYDROCHLORIDE 80 MG/1
160 CAPSULE ORAL 2 TIMES DAILY
Qty: 360 CAPSULE | Refills: 3 | Status: SHIPPED | OUTPATIENT
Start: 2025-07-01

## 2025-07-01 RX ORDER — DOXYCYCLINE 100 MG/1
1 TABLET ORAL
COMMUNITY
Start: 2025-01-02

## 2025-07-01 ASSESSMENT — COLUMBIA-SUICIDE SEVERITY RATING SCALE - C-SSRS
TOTAL  NUMBER OF ABORTED OR SELF INTERRUPTED ATTEMPTS SINCE LAST CONTACT: NO
TOTAL  NUMBER OF INTERRUPTED ATTEMPTS LIFETIME: NO
2. HAVE YOU ACTUALLY HAD ANY THOUGHTS OF KILLING YOURSELF?: NO
6. HAVE YOU EVER DONE ANYTHING, STARTED TO DO ANYTHING, OR PREPARED TO DO ANYTHING TO END YOUR LIFE?: NO
1. HAVE YOU WISHED YOU WERE DEAD OR WISHED YOU COULD GO TO SLEEP AND NOT WAKE UP?: NO
ATTEMPT LIFETIME: NO
2. HAVE YOU ACTUALLY HAD ANY THOUGHTS OF KILLING YOURSELF?: NO
SUICIDE, SINCE LAST CONTACT: NO
6. HAVE YOU EVER DONE ANYTHING, STARTED TO DO ANYTHING, OR PREPARED TO DO ANYTHING TO END YOUR LIFE?: NO
TOTAL  NUMBER OF ABORTED OR SELF INTERRUPTED ATTEMPTS LIFETIME: NO
ATTEMPT SINCE LAST CONTACT: NO
1. SINCE LAST CONTACT, HAVE YOU WISHED YOU WERE DEAD OR WISHED YOU COULD GO TO SLEEP AND NOT WAKE UP?: NO
TOTAL  NUMBER OF INTERRUPTED ATTEMPTS SINCE LAST CONTACT: NO

## 2025-07-01 ASSESSMENT — PAIN SCALES - GENERAL: PAINLEVEL_OUTOF10: 0-NO PAIN

## 2025-07-01 NOTE — PROGRESS NOTES
Patient here at the clinic accompanied by mom for his monthly injections of vivitrol and Aristada for impulse control disorder and Autism spectrum     Patient identified by full name and , temperature,pulse and respiration  obtained. Patient last seen by provider on 24 and last seen by nurse on 56/3/25 Medication verified by providers note and medication order.     Appetite: no change  Sleep: no change  Appearance: clean, age appropriate, well-groomed  Build: obese   Attitude: cooperative, calm, pleasant, friendly  Eye Contact: avoidance   Activity: alert, attentive, appropriate  Speech: non-verbal  Delusions: mom denies  Thought Content: Intellectually disabled, non-verbal, but patient will follow simple commands  Thought Process: Intellectually disabled, non-verbal, but follows simple commands  Judgement/insight/ intellectually disabled but can distinguish between right and wrong  Mood: calm,happy   Affect: appropriate  AH/VH/SI/HI: mom denies  Access to firearms/weapons: No  Depression: mom denies  Thoughts of hopelessness: no evidence of this per mom  Anxiety: mom denies  Self-injurious behavior: mom denies  Cravings/Urges: NA  Last use: NA  Tobacco Use: non-smoker  Spiritual or cultural practices that may affect your care or impact your health care decisions? No  Living situation: Patient lives with his mother  Employed: No      Patient is non-verbal but able to understand and  answer yes and no questions by nodding his head.  Patient  is able follow verbal instructions. No evidence of SI/HI, self harming behaviors per mom and  no new medications or recent hospitalizations to report at this time.  Per mom patient continues to display his OCD behaviors, but no signs or symptoms of AH/VH, paranoia, or SI/HI at this time.     Patient received Aristada 882mg/3.2ml via 20G,1 and 1/2 needle  in his left gluteal area. Patient tolerated the procedure well.      Lot: [id number]  Exp: [id number]  NDC:  78698-416-31     Patient received Vivitrol 380mg via 20G,1 and 1/2 needle into right gluteal area. Patient tolerated the procedure well.     Lot #: [id number]  Exp: [id number]  NDC: 56046-794-51     RN provided education to patient's mom on medication and side effects and the importance of maintaining monthly injection schedule. RN provided mom with an office number to call for any future questions and concerns. Mom also informed to schedule a follow up appt with pt's provider and aware to call 911 or mobile crisis number for emergent situations.     Patient to RTC in 4 weeks     Margot Gu RN, BSN

## 2025-07-16 ASSESSMENT — ENCOUNTER SYMPTOMS
TREMORS: 0
CONSTIPATION: 0
FEVER: 0
SEIZURES: 0
DIZZINESS: 0
POLYDIPSIA: 0
UNEXPECTED WEIGHT CHANGE: 0
TROUBLE SWALLOWING: 0
CHOKING: 0
VOMITING: 0
DIARRHEA: 0

## 2025-07-16 NOTE — PROGRESS NOTES
Outpatient Adult IDD Psychiatry    A HIPAA-compliant interactive audio and video telecommunication system which permits real time communications between the patient (at the originating site) and provider (at the distant site) was utilized to provide this telehealth service.     The patient, family, caregivers and guardian (as appropriate) have provided consent to conduct treatment via this telehealth service.      The patient's identity and physical location were verified at the time of this visit.     Present for appointment: Chris and mom/guardian (Martinez).    Appointment location: Home.  [place], [place]    SUBJECTIVE    Last visit with Chris was just over one year ago (May 2024).    He has not really had any noteworthy changes in his overall health.    He has gradually been re-gaining some weight (currently 277 lbs), with an attendant increase in his A1c.    Insurance would not approve Rx for GLP-1 agonist.    Mom says there have been no changes in his O/C behaviors.  He still tries to organize things compulsively, even when the time isn't appropriate.  For example, he will try to take dishes away from people while they're eating; he will take away utensils and put dirty silverware back into the drawer.    Mom also says that lately he's been having some additional disruptive problematic behaviors like urinating in in appropriate places and rumination or regurgitating food and spitting it out on the floor.    He is still participating in weekly online speech therapy sessions for about an hour.    He likes going out for car rides, and consistently travels to the [facility] every 4 weeks for his injections w/o problems.    There have not been any major changes to his sleep patterns.  He seems to start sleeping less, or having more sleep disruptions, as his monthly injection approaches.    He does not have any abnormal or involuntary movements.     Review of Systems   Constitutional:  Negative for fever  and unexpected weight change.   HENT:  Negative for drooling and trouble swallowing.    Respiratory:  Negative for choking.    Gastrointestinal:  Negative for constipation, diarrhea and vomiting.   Endocrine: Negative for polydipsia and polyuria.   Genitourinary:  Negative for enuresis.   Musculoskeletal:  Negative for gait problem.   Neurological:  Negative for dizziness, tremors, seizures and syncope.   Psychiatric/Behavioral:  Positive for behavioral problems and sleep disturbance. Negative for self-injury.         Controlled Substance Evaluation  [place]ARRS/PDMP reviewed: Nils Willingham MD on 7/17/2025  2:26 PM  Is the patient prescribed a combination of a benzodiazepine and opioid? No  I have personally reviewed the OARRS report for Chris Mehta.   I have considered the risks of abuse, dependence, addiction and diversion.    I believe that it is clinically appropriate for Chris Mehta to be prescribed this medication.    Last Urine Drug Screen:  No results found for this or any previous visit (from the past 8760 hours).    Controlled Substance Agreement: To be completed  Reviewed Controlled Substance Agreement, including but not limited to:  Benefits, risks, and alternatives to treatment with a controlled substance medication(s).    Prescribed Controlled Substances:  > Benzodiazepines: Clonazepam  What is the patient's goal of therapy? Reduce anxiety and OCD  Is this being achieved with current treatment? Unknown  KIARA-7: Unable to obtain  Activities of Daily Living:   Is your overall impression that this patient is benefiting (symptom reduction outweighs side effects) from benzodiazepine therapy? Unknown  1. Physical Functioning: Same  2. Family Relationship: Worse  3. Social Relationship: Same  4. Mood: Same  5. Sleep Patterns: Same  6. Overall Function: Same    MEDICATION HISTORY  Bupropion  Buspirone - dosed up to 5mg twice daily w/o benefit  Fluoxetine - increased irritability  Fluvoxamine - insomnia,  behavioral activation  Quetiapine - no benefit  Risperidone - took for many years, caused significant weight gain & gynecomastia  Ritalin - increased agitation  Sertraline - insomnia, behavioral activation  Topiramate - irritability  Trazodone    CURRENT MEDICATIONS  Medications Prior to Visit[1]     SOCIAL HISTORY  Living situation Lives with family   Provider agency N/A   Work or day program None currently  Weekly ST at [facility] N/A   [facility]ianship [facility]   Bx Specialist ?   Nicotine None   Alcohol None   Other drugs None     OBJECTIVE    Lab Results   Component Value Date    HGBA1C 6.3 (H) 04/02/2025     Therapeutic Drug Monitoring  07/20/2022: Ziprasidone level = 200 [ULN = 220] (320mg/day)    Electrocardiograms  None in chart    MENTAL STATUS EXAM  General/Appearance: Appropriate dress/hygiene/grooming.  Attitude/Behavior: Tries to grab at mom to end the appointment.  Speech/Communication: Nonverbal.  Motor: Paces. Restless.  Gait: Ambulates w/o difficulty.  Mood: Impatient.  Affect: Neutral.  Thought processes: Goal-directed.  Thought content: Unable to assess.  Perception: Does not appear to be experiencing or responding to hallucinations.  Sensorium/Cognition: Oriented to self and surroundings.  Memory: Not directly assessed at this time.  Insight: Absent.  Judgment: Poor.    ASSESSMENT  Since one-time use of as-needed lorazepam was very helpful for facilitating his yearly ophthalmology appointment and exam, we discussed trying a low dose of daily or as-needed clonazepam for OCD and impulse control at home.    PROBLEM LIST  ASD  Impulse control disorder  OCD    PLAN  -- START trial of clonazepam 0.5mg once or twice daily for OCD  -- Continue Aristada 882mg IM Q4 weeks for impulse control   -- Continue Vivitrol (naltrexone) 380mg IM Q4 weeks for impulse control   -- Continue ziprasidone 160mg BID for impulse control  -- Continue  "ramelteon 8mg at bedtime for sleep (Rxd by PCP)  -- Follow up 6 weeks    Nils Willingham MD    TIME-BASED BILLING SUMMARY   Preparing to see the patient on date of patient encounter: 5 minutes.  Direct time spent with patient/family/caregiver performing evaluation: 30 minutes.  Documenting clinical information: 5 minutes.  TOTAL TIME SPENT: 40 minutes.         [1]   Outpatient Medications Prior to Visit   Medication Sig Dispense Refill    adalimumab (Humira,CF,) 40 mg/0.4 mL syringe kit prefilled syringe Inject under the skin.      Aristada 882 mg/3.2 mL injection Inject 3.2 mL (882 mg) into the muscle every 28 (twenty-eight) days. 3.2 mL 12    atorvastatin (Lipitor) 10 mg tablet Take 1 tablet (10 mg) by mouth once daily.      BD Ultra-Fine Micro Pen Needle 32 gauge x 1/4\" needle USE AS INSTRUCTED TWICE DAILY      carvedilol (Coreg) 3.125 mg tablet Take 1 tablet (3.125 mg) by mouth 2 times a day.      doxycycline (Adoxa) 100 mg tablet Take 1 tablet (100 mg) by mouth every 12 hours.      gentamicin (Garamycin) 0.1 % ointment       glimepiride (Amaryl) 4 mg tablet Take 1 tablet (4 mg) by mouth once daily with a meal.      insulin lispro (HumaLOG) 100 unit/mL injection Inject under the skin 3 times a day with meals. Take as directed per insulin instructions.  38U morning, 30 units evening      insulin lispro protamin-lispro (HumaLOG Mix 75-25) 100 unit/mL (75-25) injection ADMINISTER 38 UNITS UNDER THE SKIN TWICE DAILY      levothyroxine (Synthroid, Levoxyl) 200 mcg tablet Take 1 tablet (200 mcg) by mouth once daily in the morning. Take before meals.      lisinopriL-hydrochlorothiazide 20-12.5 mg tablet Take 1 tablet by mouth once daily.      LORazepam (Ativan) 2 mg tablet Give 1 tablet as needed before dental appointments 5 tablet 0    omeprazole (PriLOSEC) 40 mg DR capsule Take 1 capsule (40 mg) by mouth once daily.      OneTouch Delica Plus Lancet 33 gauge misc USE TO TEST BLOOD GLUCOSE TWICE DAILY.      " OneTouch Ultra Test strip USE AS INSTRUCTED TO TEST BLOOD SUGAR TWO TIMES DAILY.      oxybutynin XL (Ditropan-XL) 5 mg 24 hr tablet Take 1 tablet (5 mg) by mouth once daily.      potassium chloride CR 10 mEq ER tablet Take 1 tablet (10 mEq) by mouth once daily.      ramelteon (Rozerem) 8 mg tablet Take 1 tablet (8 mg) by mouth once daily at bedtime.      tretinoin (Retin-A) 0.025 % cream Apply topically once daily at bedtime. Apply pea sized amount to full face      Trulicity 3 mg/0.5 mL pen injector ADMINISTER 0.5 ML UNDER THE SKIN 1 TIME A WEEK      VivitroL injection Inject 4 mL (380 mg) into the muscle every 28 (twenty-eight) days. 4 mL 12    ziprasidone (Geodon) 80 mg capsule Take 2 capsules (160 mg) by mouth 2 times a day. 360 capsule 3     No facility-administered medications prior to visit.

## 2025-07-17 ENCOUNTER — APPOINTMENT (OUTPATIENT)
Dept: BEHAVIORAL HEALTH | Facility: CLINIC | Age: 35
End: 2025-07-17
Payer: MEDICAID

## 2025-07-17 DIAGNOSIS — F84.0 AUTISM SPECTRUM DISORDER (HHS-HCC): ICD-10-CM

## 2025-07-17 DIAGNOSIS — F42.8 OBSESSIVE COMPULSIVE NEUROSIS: Primary | ICD-10-CM

## 2025-07-17 DIAGNOSIS — F63.9 IMPULSE CONTROL DISORDER: ICD-10-CM

## 2025-07-17 PROCEDURE — 99214 OFFICE O/P EST MOD 30 MIN: CPT | Performed by: PSYCHIATRY & NEUROLOGY

## 2025-07-17 PROCEDURE — 1036F TOBACCO NON-USER: CPT | Performed by: PSYCHIATRY & NEUROLOGY

## 2025-07-17 RX ORDER — ARIPIPRAZOLE LAUROXIL 882 MG/3.2ML
882 INJECTION, SUSPENSION, EXTENDED RELEASE INTRAMUSCULAR
Qty: 3.2 ML | Refills: 12 | Status: SHIPPED | OUTPATIENT
Start: 2025-07-17

## 2025-07-17 RX ORDER — LORAZEPAM 2 MG/1
TABLET ORAL
Qty: 5 TABLET | Refills: 0 | Status: SHIPPED | OUTPATIENT
Start: 2025-07-17

## 2025-07-17 RX ORDER — NALTREXONE 380 MG
380 KIT INTRAMUSCULAR
Qty: 4 ML | Refills: 12 | Status: SHIPPED | OUTPATIENT
Start: 2025-07-17

## 2025-07-17 RX ORDER — CLONAZEPAM 0.5 MG/1
0.5 TABLET ORAL 2 TIMES DAILY PRN
Qty: 60 TABLET | Refills: 0 | Status: SHIPPED | OUTPATIENT
Start: 2025-07-17

## 2025-07-17 ASSESSMENT — ENCOUNTER SYMPTOMS: SLEEP DISTURBANCE: 1

## 2025-07-29 ENCOUNTER — APPOINTMENT (OUTPATIENT)
Dept: BEHAVIORAL HEALTH | Facility: CLINIC | Age: 35
End: 2025-07-29
Payer: MEDICAID

## 2025-07-29 VITALS
HEART RATE: 100 BPM | BODY MASS INDEX: 42.63 KG/M2 | RESPIRATION RATE: 18 BRPM | DIASTOLIC BLOOD PRESSURE: 81 MMHG | TEMPERATURE: 97.9 F | WEIGHT: 272.2 LBS | SYSTOLIC BLOOD PRESSURE: 139 MMHG

## 2025-07-29 DIAGNOSIS — F84.0 AUTISM SPECTRUM DISORDER (HHS-HCC): ICD-10-CM

## 2025-07-29 DIAGNOSIS — F63.9 IMPULSE CONTROL DISORDER: ICD-10-CM

## 2025-07-29 ASSESSMENT — PAIN SCALES - GENERAL: PAINLEVEL_OUTOF10: 0-NO PAIN

## 2025-07-29 NOTE — PROGRESS NOTES
Patient here at the clinic accompanied by mom for his monthly injections of vivitrol and Aristada for impulse control disorder and Autism spectrum     Patient identified by full name and , temperature,pulse and respiration  obtained. Patient last seen by provider on 25 and last seen by nurse on 25 Medication verified by providers note and medication order.     Appetite: no change  Sleep: no change  Appearance: clean, age appropriate, well-groomed  Build: obese   Attitude: cooperative, calm, pleasant, friendly  Eye Contact: avoidance   Activity: alert, attentive, appropriate  Speech: non-verbal  Delusions: mom denies  Thought Content: Intellectually disabled, non-verbal, but patient will follow simple commands  Thought Process: Intellectually disabled, non-verbal, but follows simple commands  Judgement/insight/ intellectually disabled but can distinguish between right and wrong  Mood: calm,happy   Affect: appropriate  AH/VH/SI/HI: mom denies  Access to firearms/weapons: No  Depression: mom denies  Thoughts of hopelessness: no evidence of this per mom  Anxiety: mom denies  Self-injurious behavior: mom denies  Cravings/Urges: NA  Last use: NA  Tobacco Use: non-smoker  Spiritual or cultural practices that may affect your care or impact your health care decisions? No  Living situation: Patient lives with his mother  Employed: No      Patient is non-verbal but able to understand and  answer yes and no questions by nodding his head.  Patient  is able follow verbal instructions. No evidence of SI/HI, self harming behaviors per mom and  no new medications or recent hospitalizations to report at this time.  Per mom patient continues to display his OCD behaviors, but no signs or symptoms of AH/VH, paranoia, or SI/HI at this time.     Patient received Aristada 882mg/3.2ml via 20G,1 and 1/2 needle  in his right gluteal area. Patient tolerated the procedure well.      Lot: [id number]  Exp: [id number]  NDC:  45166-859-48     Patient received Vivitrol 380mg via 20G,1 and 1/2 needle into left gluteal area. Patient tolerated the procedure well.     Lot #: [id number]  Exp: [id number]  NDC: 93759-881-32     RN provided education to patient's mom on medication and side effects and the importance of maintaining monthly injection schedule. RN provided mom with an office number to call for any future questions and concerns. Mom also informed to schedule a follow up appt with pt's provider and aware to call 911 or mobile crisis number for emergent situations.     Patient to RTC in 4 weeks     Margot Gu RN, BSN

## 2025-08-26 ENCOUNTER — APPOINTMENT (OUTPATIENT)
Dept: BEHAVIORAL HEALTH | Facility: CLINIC | Age: 35
End: 2025-08-26
Payer: MEDICAID

## 2025-08-26 VITALS
WEIGHT: 272.3 LBS | RESPIRATION RATE: 18 BRPM | DIASTOLIC BLOOD PRESSURE: 92 MMHG | HEART RATE: 96 BPM | TEMPERATURE: 97.9 F | BODY MASS INDEX: 42.65 KG/M2 | SYSTOLIC BLOOD PRESSURE: 130 MMHG

## 2025-08-26 DIAGNOSIS — F84.0 AUTISM SPECTRUM DISORDER (HHS-HCC): Primary | ICD-10-CM

## 2025-08-26 ASSESSMENT — PAIN SCALES - GENERAL: PAINLEVEL_OUTOF10: 0-NO PAIN

## 2025-08-27 ENCOUNTER — APPOINTMENT (OUTPATIENT)
Dept: BEHAVIORAL HEALTH | Facility: CLINIC | Age: 35
End: 2025-08-27
Payer: MEDICAID

## 2025-08-27 DIAGNOSIS — F84.0 AUTISM SPECTRUM DISORDER (HHS-HCC): ICD-10-CM

## 2025-08-27 DIAGNOSIS — F63.9 IMPULSE CONTROL DISORDER: ICD-10-CM

## 2025-08-27 DIAGNOSIS — F42.8 OBSESSIVE COMPULSIVE NEUROSIS: Primary | ICD-10-CM

## 2025-08-27 PROCEDURE — 1036F TOBACCO NON-USER: CPT | Performed by: PSYCHIATRY & NEUROLOGY

## 2025-08-27 PROCEDURE — 90846 FAMILY PSYTX W/O PT 50 MIN: CPT | Performed by: PSYCHIATRY & NEUROLOGY

## 2025-08-27 RX ORDER — RISPERIDONE 2 MG/1
2 TABLET ORAL NIGHTLY
Qty: 30 TABLET | Refills: 0 | Status: SHIPPED | OUTPATIENT
Start: 2025-08-27

## 2025-08-27 RX ORDER — CLONAZEPAM 0.5 MG/1
0.5 TABLET ORAL 2 TIMES DAILY PRN
Qty: 30 TABLET | Refills: 0 | Status: SHIPPED | OUTPATIENT
Start: 2025-08-27

## 2025-08-27 RX ORDER — ZIPRASIDONE HYDROCHLORIDE 80 MG/1
80 CAPSULE ORAL 2 TIMES DAILY
Qty: 180 CAPSULE | Refills: 0 | Status: SHIPPED | OUTPATIENT
Start: 2025-08-27

## 2025-08-27 RX ORDER — DULAGLUTIDE 4.5 MG/.5ML
4.5 INJECTION, SOLUTION SUBCUTANEOUS WEEKLY
COMMUNITY
Start: 2025-08-17

## 2025-08-27 ASSESSMENT — ENCOUNTER SYMPTOMS
FEVER: 0
TREMORS: 0
DIARRHEA: 0
DIZZINESS: 0
UNEXPECTED WEIGHT CHANGE: 0
TROUBLE SWALLOWING: 0
CHOKING: 0
SLEEP DISTURBANCE: 1
SEIZURES: 0
VOMITING: 0
CONSTIPATION: 0
POLYDIPSIA: 0

## 2025-09-04 DIAGNOSIS — F63.9 IMPULSE CONTROL DISORDER: ICD-10-CM

## 2025-09-04 DIAGNOSIS — F42.8 OBSESSIVE COMPULSIVE NEUROSIS: ICD-10-CM

## 2025-09-04 RX ORDER — ZIPRASIDONE HYDROCHLORIDE 80 MG/1
160 CAPSULE ORAL 2 TIMES DAILY
Qty: 360 CAPSULE | Refills: 0 | Status: SHIPPED | OUTPATIENT
Start: 2025-09-04

## 2025-09-04 RX ORDER — CLONAZEPAM 0.5 MG/1
0.5 TABLET ORAL 2 TIMES DAILY PRN
Qty: 60 TABLET | Refills: 0 | Status: SHIPPED | OUTPATIENT
Start: 2025-09-04

## 2025-09-23 ENCOUNTER — APPOINTMENT (OUTPATIENT)
Dept: BEHAVIORAL HEALTH | Facility: CLINIC | Age: 35
End: 2025-09-23
Payer: MEDICAID

## 2025-10-03 ENCOUNTER — APPOINTMENT (OUTPATIENT)
Dept: BEHAVIORAL HEALTH | Facility: CLINIC | Age: 35
End: 2025-10-03
Payer: MEDICAID